# Patient Record
Sex: FEMALE | Race: WHITE | NOT HISPANIC OR LATINO | Employment: FULL TIME | URBAN - NONMETROPOLITAN AREA
[De-identification: names, ages, dates, MRNs, and addresses within clinical notes are randomized per-mention and may not be internally consistent; named-entity substitution may affect disease eponyms.]

---

## 2021-02-05 ENCOUNTER — LAB (OUTPATIENT)
Dept: LAB | Facility: HOSPITAL | Age: 56
End: 2021-02-05

## 2021-02-05 ENCOUNTER — OFFICE VISIT (OUTPATIENT)
Dept: SURGERY | Facility: CLINIC | Age: 56
End: 2021-02-05

## 2021-02-05 VITALS — HEIGHT: 61 IN | BODY MASS INDEX: 29.07 KG/M2 | WEIGHT: 154 LBS

## 2021-02-05 DIAGNOSIS — K85.10 GALLSTONE PANCREATITIS: ICD-10-CM

## 2021-02-05 DIAGNOSIS — K85.10 GALLSTONE PANCREATITIS: Primary | ICD-10-CM

## 2021-02-05 LAB
ALBUMIN SERPL-MCNC: 2.8 G/DL (ref 3.5–5.2)
ALBUMIN/GLOB SERPL: 0.8 G/DL
ALP SERPL-CCNC: 84 U/L (ref 39–117)
ALT SERPL W P-5'-P-CCNC: <5 U/L (ref 1–33)
AMYLASE SERPL-CCNC: 50 U/L (ref 28–100)
ANION GAP SERPL CALCULATED.3IONS-SCNC: 8 MMOL/L (ref 5–15)
AST SERPL-CCNC: 16 U/L (ref 1–32)
BASOPHILS # BLD AUTO: 0.08 10*3/MM3 (ref 0–0.2)
BASOPHILS NFR BLD AUTO: 0.6 % (ref 0–1.5)
BILIRUB SERPL-MCNC: <0.2 MG/DL (ref 0–1.2)
BUN SERPL-MCNC: 5 MG/DL (ref 6–20)
BUN/CREAT SERPL: 6.3 (ref 7–25)
CALCIUM SPEC-SCNC: 8.5 MG/DL (ref 8.6–10.5)
CHLORIDE SERPL-SCNC: 104 MMOL/L (ref 98–107)
CO2 SERPL-SCNC: 28 MMOL/L (ref 22–29)
CREAT SERPL-MCNC: 0.8 MG/DL (ref 0.57–1)
DEPRECATED RDW RBC AUTO: 47.6 FL (ref 37–54)
EOSINOPHIL # BLD AUTO: 0.19 10*3/MM3 (ref 0–0.4)
EOSINOPHIL NFR BLD AUTO: 1.5 % (ref 0.3–6.2)
ERYTHROCYTE [DISTWIDTH] IN BLOOD BY AUTOMATED COUNT: 14.1 % (ref 12.3–15.4)
GFR SERPL CREATININE-BSD FRML MDRD: 74 ML/MIN/1.73
GLOBULIN UR ELPH-MCNC: 3.4 GM/DL
GLUCOSE SERPL-MCNC: 107 MG/DL (ref 65–99)
HCT VFR BLD AUTO: 32 % (ref 34–46.6)
HGB BLD-MCNC: 10.9 G/DL (ref 12–15.9)
IMM GRANULOCYTES # BLD AUTO: 0.12 10*3/MM3 (ref 0–0.05)
IMM GRANULOCYTES NFR BLD AUTO: 0.9 % (ref 0–0.5)
LIPASE SERPL-CCNC: 158 U/L (ref 13–60)
LYMPHOCYTES # BLD AUTO: 1.71 10*3/MM3 (ref 0.7–3.1)
LYMPHOCYTES NFR BLD AUTO: 13.1 % (ref 19.6–45.3)
MCH RBC QN AUTO: 31.8 PG (ref 26.6–33)
MCHC RBC AUTO-ENTMCNC: 34.1 G/DL (ref 31.5–35.7)
MCV RBC AUTO: 93.3 FL (ref 79–97)
MONOCYTES # BLD AUTO: 0.74 10*3/MM3 (ref 0.1–0.9)
MONOCYTES NFR BLD AUTO: 5.7 % (ref 5–12)
NEUTROPHILS NFR BLD AUTO: 10.21 10*3/MM3 (ref 1.7–7)
NEUTROPHILS NFR BLD AUTO: 78.2 % (ref 42.7–76)
NRBC BLD AUTO-RTO: 0 /100 WBC (ref 0–0.2)
PLATELET # BLD AUTO: 268 10*3/MM3 (ref 140–450)
PMV BLD AUTO: 11.5 FL (ref 6–12)
POTASSIUM SERPL-SCNC: 3.7 MMOL/L (ref 3.5–5.2)
PROT SERPL-MCNC: 6.2 G/DL (ref 6–8.5)
RBC # BLD AUTO: 3.43 10*6/MM3 (ref 3.77–5.28)
SODIUM SERPL-SCNC: 140 MMOL/L (ref 136–145)
WBC # BLD AUTO: 13.05 10*3/MM3 (ref 3.4–10.8)

## 2021-02-05 PROCEDURE — 36415 COLL VENOUS BLD VENIPUNCTURE: CPT

## 2021-02-05 PROCEDURE — 99204 OFFICE O/P NEW MOD 45 MIN: CPT | Performed by: SURGERY

## 2021-02-05 PROCEDURE — 80053 COMPREHEN METABOLIC PANEL: CPT

## 2021-02-05 PROCEDURE — 85025 COMPLETE CBC W/AUTO DIFF WBC: CPT

## 2021-02-05 PROCEDURE — 83690 ASSAY OF LIPASE: CPT

## 2021-02-05 PROCEDURE — 82150 ASSAY OF AMYLASE: CPT

## 2021-02-05 RX ORDER — SIMVASTATIN 20 MG
20 TABLET ORAL NIGHTLY
COMMUNITY
End: 2021-05-24

## 2021-02-05 RX ORDER — SODIUM CHLORIDE 0.9 % (FLUSH) 0.9 %
3 SYRINGE (ML) INJECTION EVERY 12 HOURS SCHEDULED
Status: CANCELLED | OUTPATIENT
Start: 2021-02-09

## 2021-02-05 RX ORDER — BUPIVACAINE HCL/0.9 % NACL/PF 0.1 %
2 PLASTIC BAG, INJECTION (ML) EPIDURAL ONCE
Status: CANCELLED | OUTPATIENT
Start: 2021-02-09 | End: 2021-02-05

## 2021-02-05 RX ORDER — PANTOPRAZOLE SODIUM 40 MG/1
40 TABLET, DELAYED RELEASE ORAL DAILY
COMMUNITY
End: 2021-05-05 | Stop reason: SDUPTHER

## 2021-02-05 RX ORDER — CARISOPRODOL 350 MG/1
350 TABLET ORAL 4 TIMES DAILY PRN
COMMUNITY

## 2021-02-05 RX ORDER — ESTRADIOL 2 MG/1
2 TABLET ORAL DAILY
COMMUNITY

## 2021-02-05 RX ORDER — SODIUM CHLORIDE 0.9 % (FLUSH) 0.9 %
10 SYRINGE (ML) INJECTION AS NEEDED
Status: CANCELLED | OUTPATIENT
Start: 2021-02-09

## 2021-02-05 RX ORDER — ONDANSETRON 4 MG/1
4 TABLET, FILM COATED ORAL EVERY 8 HOURS PRN
COMMUNITY
End: 2021-06-10

## 2021-02-05 RX ORDER — ALPRAZOLAM 1 MG/1
1 TABLET ORAL 3 TIMES DAILY PRN
COMMUNITY

## 2021-02-05 RX ORDER — LORATADINE 10 MG/1
10 TABLET ORAL NIGHTLY
COMMUNITY

## 2021-02-05 RX ORDER — HYDROCODONE BITARTRATE AND ACETAMINOPHEN 10; 325 MG/1; MG/1
1 TABLET ORAL EVERY 6 HOURS PRN
COMMUNITY
End: 2021-02-08

## 2021-02-05 NOTE — PROGRESS NOTES
Subjective   Emelyn Cook is a 55 y.o. female     Chief Complaint: Gallstone pancreatitis    History of Present Illness 55-year-old female presented and was admitted to the hospital in [place] on January 25, 2021 with a diagnosis of acute pancreatitis.  Was basically admitted and treated for the pancreatitis.  Was found to have gallstones by ultrasound.  She underwent a total of 3 CT scans while she was in the hospital.  On Monday, February 1 she was taken to the operating room by one of the surgeons and he basically did a diagnostic laparoscopy and decided not to proceed with her cholecystectomy.  Her amylase and lipase were normal at the time LFTs were normal.  He felt he was going to have to open her and instead of open or he stopped the procedure as the patient tells me he did not want to have to open her to remove her gallbladder.  Patient drinks alcohol only occasionally.  Last alcohol she had was a small amount of radha about 3 weeks ago but she goes weeks without drinking any type of alcohol.  She is now been home for 3 days.  She is getting her strength back.  She is able to get up and move around better now.  Pain was described as being in the epigastrium and radiating through to her back.  She denies having any this epigastric pain in the past.  No history of jaundice.  She does continue to feel somewhat bloated but she is tolerating regular diet and having normal bowel function now.  She does not smoke.  She is fairly healthy she played softball up until recently.  Only prior abdominal surgery was a total abdominal hysterectomy and bilateral salpingo-oophorectomy.  Only medication really was hormones and cholesterol medication.    Review of Systems   Constitutional: Negative.    HENT: Negative.    Eyes: Negative.    Respiratory: Negative.    Cardiovascular: Negative.    Gastrointestinal: Positive for abdominal pain.        Heartburn   Endocrine: Negative.    Genitourinary: Negative.     Musculoskeletal: Negative.    Skin: Negative.    Allergic/Immunologic: Negative.    Neurological: Negative.    Hematological: Negative.    Psychiatric/Behavioral: Negative.      Past Medical History:   Diagnosis Date   • Anxiety    • Depression    • Pancreatitis due to common bile duct stone      Past Surgical History:   Procedure Laterality Date   •  SECTION     • DIAGNOSTIC LAPAROSCOPY     • DILATATION AND CURETTAGE     • HYSTERECTOMY       Family History   Problem Relation Age of Onset   • Diabetes Sister    • Diabetes Maternal Aunt      Social History     Socioeconomic History   • Marital status:      Spouse name: Not on file   • Number of children: Not on file   • Years of education: Not on file   • Highest education level: Not on file     Allergies   Allergen Reactions   • Levaquin [Levofloxacin] Mental Status Change     There were no vitals filed for this visit.    Home Medications:  Prior to Admission medications    Medication Sig Start Date End Date Taking? Authorizing Provider   ALPRAZolam (XANAX) 1 MG tablet Take 1 mg by mouth 2 (Two) Times a Day As Needed for Anxiety.   Yes Mehul Fair MD   carisoprodol (SOMA) 350 MG tablet Take 350 mg by mouth 4 (Four) Times a Day As Needed for Muscle Spasms.   Yes Mehul Fair MD   estradiol (ESTRACE) 2 MG tablet Take 2 mg by mouth Daily.   Yes Mehul Fair MD   HYDROcodone-acetaminophen (NORCO)  MG per tablet Take 1 tablet by mouth Every 6 (Six) Hours As Needed for Moderate Pain .   Yes Mehul Fair MD   loratadine (CLARITIN) 10 MG tablet Take 10 mg by mouth Daily.   Yes Mehul Fair MD   ondansetron (ZOFRAN) 4 MG tablet Take 4 mg by mouth Every 8 (Eight) Hours As Needed for Nausea or Vomiting.   Yes Mehul Fair MD   pantoprazole (PROTONIX) 40 MG EC tablet Take 40 mg by mouth Daily.   Yes Mehul Fair MD   simvastatin (ZOCOR) 20 MG tablet Take 20 mg by mouth Every Night.   Yes  Provider, MD Mehul   Zolpidem Tartrate 10 MG sublingual tablet Place  under the tongue.   Yes Provider, MD Mehul       Objective   Physical Exam  Constitutional:       General: She is not in acute distress.     Appearance: She is well-developed.   HENT:      Head: Normocephalic and atraumatic.   Eyes:      General: No scleral icterus.     Conjunctiva/sclera: Conjunctivae normal.   Neck:      Musculoskeletal: Normal range of motion and neck supple.      Thyroid: No thyromegaly.      Trachea: No tracheal deviation.   Cardiovascular:      Rate and Rhythm: Normal rate and regular rhythm.      Heart sounds: Normal heart sounds. No murmur. No friction rub. No gallop.    Pulmonary:      Effort: Pulmonary effort is normal. No respiratory distress.      Breath sounds: Normal breath sounds. No stridor. No wheezing or rales.   Chest:      Chest wall: No tenderness.   Abdominal:      General: Bowel sounds are normal. There is distension.      Palpations: Abdomen is soft. There is no mass.      Tenderness: There is no abdominal tenderness. There is no guarding or rebound.      Hernia: No hernia is present.   Musculoskeletal: Normal range of motion.         General: No deformity.   Lymphadenopathy:      Cervical: No cervical adenopathy.   Skin:     General: Skin is warm and dry.      Coloration: Skin is not pale.      Findings: No erythema or rash.      Nails: There is no clubbing.     Neurological:      Mental Status: She is alert and oriented to person, place, and time.   Psychiatric:         Behavior: Behavior normal.         Thought Content: Thought content normal.     Abdomen is mildly distended but soft.  2 incisions are clean.  Clearly no peritoneal signs no hernias.    Assessment/Plan Gallstone pancreatitis.  Discussed the overall situation with both the patient and her mother-in-law who accompanied her.  Would recommend laparoscopic cholecystectomy and interoperative cholangiogram.  She is at increased risk  of an open procedure due to her history of pancreatitis.  She is also some risk for recurrent pancreatitis she still has gallstones she understands that as well.  Clinically it sounds like she is actually gotten better over the last few days.  We will check an amylase and lipase as well as LFTs and a CBC on her today.  Tentatively we will plan on laparoscopic ostectomy interoperative cholangiogram next Tuesday.  Went over the procedure alternatives risk benefits with the patient she clearly understands and wishes to proceed.  She understands she will need to get the Covid test and what that entails.      The encounter diagnosis was Gallstone pancreatitis.                     This document has been electronically signed by Salvatore Hernandez MD on February 5, 2021 12:44 CST

## 2021-02-05 NOTE — H&P (VIEW-ONLY)
Subjective   Emelyn Cook is a 55 y.o. female     Chief Complaint: Gallstone pancreatitis    History of Present Illness 55-year-old female presented and was admitted to the hospital in [place] on January 25, 2021 with a diagnosis of acute pancreatitis.  Was basically admitted and treated for the pancreatitis.  Was found to have gallstones by ultrasound.  She underwent a total of 3 CT scans while she was in the hospital.  On Monday, February 1 she was taken to the operating room by one of the surgeons and he basically did a diagnostic laparoscopy and decided not to proceed with her cholecystectomy.  Her amylase and lipase were normal at the time LFTs were normal.  He felt he was going to have to open her and instead of open or he stopped the procedure as the patient tells me he did not want to have to open her to remove her gallbladder.  Patient drinks alcohol only occasionally.  Last alcohol she had was a small amount of radha about 3 weeks ago but she goes weeks without drinking any type of alcohol.  She is now been home for 3 days.  She is getting her strength back.  She is able to get up and move around better now.  Pain was described as being in the epigastrium and radiating through to her back.  She denies having any this epigastric pain in the past.  No history of jaundice.  She does continue to feel somewhat bloated but she is tolerating regular diet and having normal bowel function now.  She does not smoke.  She is fairly healthy she played softball up until recently.  Only prior abdominal surgery was a total abdominal hysterectomy and bilateral salpingo-oophorectomy.  Only medication really was hormones and cholesterol medication.    Review of Systems   Constitutional: Negative.    HENT: Negative.    Eyes: Negative.    Respiratory: Negative.    Cardiovascular: Negative.    Gastrointestinal: Positive for abdominal pain.        Heartburn   Endocrine: Negative.    Genitourinary: Negative.       Musculoskeletal: Negative.    Skin: Negative.    Allergic/Immunologic: Negative.    Neurological: Negative.    Hematological: Negative.    Psychiatric/Behavioral: Negative.      Past Medical History:   Diagnosis Date   • Anxiety    • Depression    • Pancreatitis due to common bile duct stone      Past Surgical History:   Procedure Laterality Date   •  SECTION     • DIAGNOSTIC LAPAROSCOPY     • DILATATION AND CURETTAGE     • HYSTERECTOMY       Family History   Problem Relation Age of Onset   • Diabetes Sister    • Diabetes Maternal Aunt      Social History     Socioeconomic History   • Marital status:      Spouse name: Not on file   • Number of children: Not on file   • Years of education: Not on file   • Highest education level: Not on file     Allergies   Allergen Reactions   • Levaquin [Levofloxacin] Mental Status Change     There were no vitals filed for this visit.    Home Medications:  Prior to Admission medications    Medication Sig Start Date End Date Taking? Authorizing Provider   ALPRAZolam (XANAX) 1 MG tablet Take 1 mg by mouth 2 (Two) Times a Day As Needed for Anxiety.   Yes Mehul Fair MD   carisoprodol (SOMA) 350 MG tablet Take 350 mg by mouth 4 (Four) Times a Day As Needed for Muscle Spasms.   Yes Mehul Fair MD   estradiol (ESTRACE) 2 MG tablet Take 2 mg by mouth Daily.   Yes Mehul Fair MD   HYDROcodone-acetaminophen (NORCO)  MG per tablet Take 1 tablet by mouth Every 6 (Six) Hours As Needed for Moderate Pain .   Yes Mehul Fair MD   loratadine (CLARITIN) 10 MG tablet Take 10 mg by mouth Daily.   Yes Mehul Fair MD   ondansetron (ZOFRAN) 4 MG tablet Take 4 mg by mouth Every 8 (Eight) Hours As Needed for Nausea or Vomiting.   Yes Mehul Fair MD   pantoprazole (PROTONIX) 40 MG EC tablet Take 40 mg by mouth Daily.   Yes Mehul Fair MD   simvastatin (ZOCOR) 20 MG tablet Take 20 mg by mouth Every Night.   Yes  Provider, MD Mehul   Zolpidem Tartrate 10 MG sublingual tablet Place  under the tongue.   Yes Provider, MD Mehul       Objective   Physical Exam  Constitutional:       General: She is not in acute distress.     Appearance: She is well-developed.   HENT:      Head: Normocephalic and atraumatic.   Eyes:      General: No scleral icterus.     Conjunctiva/sclera: Conjunctivae normal.   Neck:      Musculoskeletal: Normal range of motion and neck supple.      Thyroid: No thyromegaly.      Trachea: No tracheal deviation.   Cardiovascular:      Rate and Rhythm: Normal rate and regular rhythm.      Heart sounds: Normal heart sounds. No murmur. No friction rub. No gallop.    Pulmonary:      Effort: Pulmonary effort is normal. No respiratory distress.      Breath sounds: Normal breath sounds. No stridor. No wheezing or rales.   Chest:      Chest wall: No tenderness.   Abdominal:      General: Bowel sounds are normal. There is distension.      Palpations: Abdomen is soft. There is no mass.      Tenderness: There is no abdominal tenderness. There is no guarding or rebound.      Hernia: No hernia is present.   Musculoskeletal: Normal range of motion.         General: No deformity.   Lymphadenopathy:      Cervical: No cervical adenopathy.   Skin:     General: Skin is warm and dry.      Coloration: Skin is not pale.      Findings: No erythema or rash.      Nails: There is no clubbing.     Neurological:      Mental Status: She is alert and oriented to person, place, and time.   Psychiatric:         Behavior: Behavior normal.         Thought Content: Thought content normal.     Abdomen is mildly distended but soft.  2 incisions are clean.  Clearly no peritoneal signs no hernias.    Assessment/Plan Gallstone pancreatitis.  Discussed the overall situation with both the patient and her mother-in-law who accompanied her.  Would recommend laparoscopic cholecystectomy and interoperative cholangiogram.  She is at increased risk  of an open procedure due to her history of pancreatitis.  She is also some risk for recurrent pancreatitis she still has gallstones she understands that as well.  Clinically it sounds like she is actually gotten better over the last few days.  We will check an amylase and lipase as well as LFTs and a CBC on her today.  Tentatively we will plan on laparoscopic ostectomy interoperative cholangiogram next Tuesday.  Went over the procedure alternatives risk benefits with the patient she clearly understands and wishes to proceed.  She understands she will need to get the Covid test and what that entails.      The encounter diagnosis was Gallstone pancreatitis.                     This document has been electronically signed by Salvatore Hernandez MD on February 5, 2021 12:44 CST

## 2021-02-05 NOTE — PATIENT INSTRUCTIONS
"BMI for Adults  What is BMI?  Body mass index (BMI) is a number that is calculated from a person's weight and height. BMI can help estimate how much of a person's weight is composed of fat. BMI does not measure body fat directly. Rather, it is an alternative to procedures that directly measure body fat, which can be difficult and expensive.  BMI can help identify people who may be at higher risk for certain medical problems.  What are BMI measurements used for?  BMI is used as a screening tool to identify possible weight problems. It helps determine whether a person is obese, overweight, a healthy weight, or underweight.  BMI is useful for:  · Identifying a weight problem that may be related to a medical condition or may increase the risk for medical problems.  · Promoting changes, such as changes in diet and exercise, to help reach a healthy weight. BMI screening can be repeated to see if these changes are working.  How is BMI calculated?  BMI involves measuring your weight in relation to your height. Both height and weight are measured, and the BMI is calculated from those numbers. This can be done either in English ([place] or metric measurements. Note that charts and online BMI calculators are available to help you find your BMI quickly and easily without having to do these calculations yourself.  To calculate your BMI in English ([place] measurements:    1. Measure your weight in pounds (lb).  2. Multiply the number of pounds by 703.  ? For example, for a person who weighs 180 lb, multiply that number by 703, which equals 126,540.  3. Measure your height in inches. Then multiply that number by itself to get a measurement called \"inches squared.\"  ? For example, for a person who is 70 inches tall, the \"inches squared\" measurement is 70 inches x 70 inches, which equals 4,900 inches squared.  4. Divide the total from step 2 (number of lb x 703) by the total from step 3 (inches squared): 126,540 ÷ 4,900 = 25.8. This is " "your BMI.  To calculate your BMI in metric measurements:  1. Measure your weight in kilograms (kg).  2. Measure your height in meters (m). Then multiply that number by itself to get a measurement called \"meters squared.\"  ? For example, for a person who is 1.75 m tall, the \"meters squared\" measurement is 1.75 m x 1.75 m, which is equal to 3.1 meters squared.  3. Divide the number of kilograms (your weight) by the meters squared number. In this example: 70 ÷ 3.1 = 22.6. This is your BMI.  What do the results mean?  BMI charts are used to identify whether you are underweight, normal weight, overweight, or obese. The following guidelines will be used:  · Underweight: BMI less than 18.5.  · Normal weight: BMI between 18.5 and 24.9.  · Overweight: BMI between 25 and 29.9.  · Obese: BMI of 30 or above.  Keep these notes in mind:  · Weight includes both fat and muscle, so someone with a muscular build, such as an athlete, may have a BMI that is higher than 24.9. In cases like these, BMI is not an accurate measure of body fat.  · To determine if excess body fat is the cause of a BMI of 25 or higher, further assessments may need to be done by a health care provider.  · BMI is usually interpreted in the same way for men and women.  Where to find more information  For more information about BMI, including tools to quickly calculate your BMI, go to these websites:  · Centers for Disease Control and Prevention: www.cdc.gov  · American Heart Association: www.heart.org  · National Heart, Lung, and Blood [place]: www.nhlbi.nih.gov  Summary  · Body mass index (BMI) is a number that is calculated from a person's weight and height.  · BMI may help estimate how much of a person's weight is composed of fat. BMI can help identify those who may be at higher risk for certain medical problems.  · BMI can be measured using English measurements or metric measurements.  · BMI charts are used to identify whether you are underweight, normal " weight, overweight, or obese.  This information is not intended to replace advice given to you by your health care provider. Make sure you discuss any questions you have with your health care provider.  Document Revised: 09/09/2020 Document Reviewed: 07/17/2020  Elsevier Patient Education © 2020 [employer]

## 2021-02-06 ENCOUNTER — LAB (OUTPATIENT)
Dept: LAB | Facility: HOSPITAL | Age: 56
End: 2021-02-06

## 2021-02-06 DIAGNOSIS — Z01.818 PREOP TESTING: Primary | ICD-10-CM

## 2021-02-06 PROCEDURE — C9803 HOPD COVID-19 SPEC COLLECT: HCPCS

## 2021-02-06 PROCEDURE — U0004 COV-19 TEST NON-CDC HGH THRU: HCPCS

## 2021-02-07 LAB — SARS-COV-2 ORF1AB RESP QL NAA+PROBE: NOT DETECTED

## 2021-02-08 ENCOUNTER — ANESTHESIA EVENT (OUTPATIENT)
Dept: PERIOP | Facility: HOSPITAL | Age: 56
End: 2021-02-08

## 2021-02-09 ENCOUNTER — HOSPITAL ENCOUNTER (INPATIENT)
Facility: HOSPITAL | Age: 56
LOS: 1 days | Discharge: HOME | End: 2021-02-10
Attending: SURGERY | Admitting: SURGERY

## 2021-02-09 ENCOUNTER — ANESTHESIA (OUTPATIENT)
Dept: PERIOP | Facility: HOSPITAL | Age: 56
End: 2021-02-09

## 2021-02-09 ENCOUNTER — APPOINTMENT (OUTPATIENT)
Dept: GENERAL RADIOLOGY | Facility: HOSPITAL | Age: 56
End: 2021-02-09

## 2021-02-09 DIAGNOSIS — K85.10 GALLSTONE PANCREATITIS: ICD-10-CM

## 2021-02-09 LAB
ALBUMIN SERPL-MCNC: 3.2 G/DL (ref 3.5–5.2)
ALBUMIN/GLOB SERPL: 0.8 G/DL
ALP SERPL-CCNC: 110 U/L (ref 39–117)
ALT SERPL W P-5'-P-CCNC: 8 U/L (ref 1–33)
ANION GAP SERPL CALCULATED.3IONS-SCNC: 12 MMOL/L (ref 5–15)
AST SERPL-CCNC: 36 U/L (ref 1–32)
BILIRUB SERPL-MCNC: <0.2 MG/DL (ref 0–1.2)
BUN SERPL-MCNC: 5 MG/DL (ref 6–20)
BUN/CREAT SERPL: 7.1 (ref 7–25)
CALCIUM SPEC-SCNC: 8.7 MG/DL (ref 8.6–10.5)
CHLORIDE SERPL-SCNC: 103 MMOL/L (ref 98–107)
CO2 SERPL-SCNC: 23 MMOL/L (ref 22–29)
CREAT SERPL-MCNC: 0.7 MG/DL (ref 0.57–1)
DEPRECATED RDW RBC AUTO: 48.9 FL (ref 37–54)
ERYTHROCYTE [DISTWIDTH] IN BLOOD BY AUTOMATED COUNT: 13.6 % (ref 12.3–15.4)
GFR SERPL CREATININE-BSD FRML MDRD: 86 ML/MIN/1.73
GLOBULIN UR ELPH-MCNC: 3.8 GM/DL
GLUCOSE SERPL-MCNC: 121 MG/DL (ref 65–99)
HCT VFR BLD AUTO: 35.3 % (ref 34–46.6)
HGB BLD-MCNC: 11.7 G/DL (ref 12–15.9)
LYMPHOCYTES # BLD MANUAL: 1.14 10*3/MM3 (ref 0.7–3.1)
LYMPHOCYTES NFR BLD MANUAL: 1 % (ref 5–12)
LYMPHOCYTES NFR BLD MANUAL: 8 % (ref 19.6–45.3)
MCH RBC QN AUTO: 32.1 PG (ref 26.6–33)
MCHC RBC AUTO-ENTMCNC: 33.1 G/DL (ref 31.5–35.7)
MCV RBC AUTO: 96.7 FL (ref 79–97)
MONOCYTES # BLD AUTO: 0.14 10*3/MM3 (ref 0.1–0.9)
NEUTROPHILS # BLD AUTO: 12.98 10*3/MM3 (ref 1.7–7)
NEUTROPHILS NFR BLD MANUAL: 91 % (ref 42.7–76)
PLATELET # BLD AUTO: 632 10*3/MM3 (ref 140–450)
PMV BLD AUTO: 9.1 FL (ref 6–12)
POTASSIUM SERPL-SCNC: 4 MMOL/L (ref 3.5–5.2)
PROT SERPL-MCNC: 7 G/DL (ref 6–8.5)
RBC # BLD AUTO: 3.65 10*6/MM3 (ref 3.77–5.28)
RBC MORPH BLD: NORMAL
SMALL PLATELETS BLD QL SMEAR: ADEQUATE
SODIUM SERPL-SCNC: 138 MMOL/L (ref 136–145)
WBC # BLD AUTO: 14.26 10*3/MM3 (ref 3.4–10.8)
WBC MORPH BLD: NORMAL

## 2021-02-09 PROCEDURE — 25010000002 CEFAZOLIN PER 500 MG: Performed by: SURGERY

## 2021-02-09 PROCEDURE — 85025 COMPLETE CBC W/AUTO DIFF WBC: CPT | Performed by: SURGERY

## 2021-02-09 PROCEDURE — 85007 BL SMEAR W/DIFF WBC COUNT: CPT | Performed by: SURGERY

## 2021-02-09 PROCEDURE — 25010000002 HYDROMORPHONE PER 4 MG: Performed by: NURSE ANESTHETIST, CERTIFIED REGISTERED

## 2021-02-09 PROCEDURE — 25010000002 ONDANSETRON PER 1 MG: Performed by: NURSE ANESTHETIST, CERTIFIED REGISTERED

## 2021-02-09 PROCEDURE — 0FB40ZZ EXCISION OF GALLBLADDER, OPEN APPROACH: ICD-10-PCS | Performed by: SURGERY

## 2021-02-09 PROCEDURE — 25010000002 SUCCINYLCHOLINE PER 20 MG: Performed by: NURSE ANESTHETIST, CERTIFIED REGISTERED

## 2021-02-09 PROCEDURE — 25010000002 FENTANYL CITRATE (PF) 100 MCG/2ML SOLUTION: Performed by: NURSE ANESTHETIST, CERTIFIED REGISTERED

## 2021-02-09 PROCEDURE — 25010000002 NEOSTIGMINE 4 MG/4ML SOLUTION PREFILLED SYRINGE: Performed by: NURSE ANESTHETIST, CERTIFIED REGISTERED

## 2021-02-09 PROCEDURE — 25010000002 PROPOFOL 10 MG/ML EMULSION: Performed by: NURSE ANESTHETIST, CERTIFIED REGISTERED

## 2021-02-09 PROCEDURE — 47600 CHOLECYSTECTOMY: CPT | Performed by: SURGERY

## 2021-02-09 PROCEDURE — 80053 COMPREHEN METABOLIC PANEL: CPT | Performed by: SURGERY

## 2021-02-09 PROCEDURE — 25010000002 HYDROMORPHONE 1 MG/ML SOLUTION: Performed by: NURSE ANESTHETIST, CERTIFIED REGISTERED

## 2021-02-09 PROCEDURE — 25010000002 HYDROMORPHONE 1 MG/ML SOLUTION: Performed by: ANESTHESIOLOGY

## 2021-02-09 PROCEDURE — 25010000002 MIDAZOLAM PER 1 MG: Performed by: NURSE ANESTHETIST, CERTIFIED REGISTERED

## 2021-02-09 PROCEDURE — 25010000002 DEXAMETHASONE PER 1 MG: Performed by: NURSE ANESTHETIST, CERTIFIED REGISTERED

## 2021-02-09 PROCEDURE — 0FJ44ZZ INSPECTION OF GALLBLADDER, PERCUTANEOUS ENDOSCOPIC APPROACH: ICD-10-PCS | Performed by: SURGERY

## 2021-02-09 PROCEDURE — 47600 CHOLECYSTECTOMY: CPT | Performed by: SPECIALIST/TECHNOLOGIST, OTHER

## 2021-02-09 PROCEDURE — 25010000002 MORPHINE PER 10 MG: Performed by: SURGERY

## 2021-02-09 DEVICE — LIGAMAX 5 MM ENDOSCOPIC MULTIPLE CLIP APPLIER
Type: IMPLANTABLE DEVICE | Site: ABDOMEN | Status: FUNCTIONAL
Brand: LIGAMAX

## 2021-02-09 RX ORDER — ESTRADIOL 1 MG/1
2 TABLET ORAL DAILY
Status: DISCONTINUED | OUTPATIENT
Start: 2021-02-09 | End: 2021-02-09

## 2021-02-09 RX ORDER — FENTANYL CITRATE 50 UG/ML
INJECTION, SOLUTION INTRAMUSCULAR; INTRAVENOUS AS NEEDED
Status: DISCONTINUED | OUTPATIENT
Start: 2021-02-09 | End: 2021-02-09 | Stop reason: SURG

## 2021-02-09 RX ORDER — MEPERIDINE HYDROCHLORIDE 25 MG/ML
12.5 INJECTION INTRAMUSCULAR; INTRAVENOUS; SUBCUTANEOUS
Status: DISCONTINUED | OUTPATIENT
Start: 2021-02-09 | End: 2021-02-09 | Stop reason: HOSPADM

## 2021-02-09 RX ORDER — PANTOPRAZOLE SODIUM 40 MG/1
40 TABLET, DELAYED RELEASE ORAL DAILY
Status: DISCONTINUED | OUTPATIENT
Start: 2021-02-10 | End: 2021-02-10 | Stop reason: HOSPADM

## 2021-02-09 RX ORDER — NEOSTIGMINE METHYLSULFATE 4 MG/4 ML
SYRINGE (ML) INTRAVENOUS AS NEEDED
Status: DISCONTINUED | OUTPATIENT
Start: 2021-02-09 | End: 2021-02-09 | Stop reason: SURG

## 2021-02-09 RX ORDER — NALOXONE HCL 0.4 MG/ML
0.4 VIAL (ML) INJECTION
Status: DISCONTINUED | OUTPATIENT
Start: 2021-02-09 | End: 2021-02-10 | Stop reason: HOSPADM

## 2021-02-09 RX ORDER — CARISOPRODOL 350 MG/1
350 TABLET ORAL 4 TIMES DAILY PRN
Status: DISCONTINUED | OUTPATIENT
Start: 2021-02-09 | End: 2021-02-10 | Stop reason: HOSPADM

## 2021-02-09 RX ORDER — LIDOCAINE HYDROCHLORIDE 20 MG/ML
INJECTION, SOLUTION INFILTRATION; PERINEURAL AS NEEDED
Status: DISCONTINUED | OUTPATIENT
Start: 2021-02-09 | End: 2021-02-09 | Stop reason: SURG

## 2021-02-09 RX ORDER — SCOLOPAMINE TRANSDERMAL SYSTEM 1 MG/1
1 PATCH, EXTENDED RELEASE TRANSDERMAL CONTINUOUS
Status: ACTIVE | OUTPATIENT
Start: 2021-02-09 | End: 2021-02-10

## 2021-02-09 RX ORDER — ALPRAZOLAM 1 MG/1
1 TABLET ORAL 2 TIMES DAILY PRN
Status: DISCONTINUED | OUTPATIENT
Start: 2021-02-09 | End: 2021-02-10 | Stop reason: HOSPADM

## 2021-02-09 RX ORDER — DEXAMETHASONE SODIUM PHOSPHATE 4 MG/ML
INJECTION, SOLUTION INTRA-ARTICULAR; INTRALESIONAL; INTRAMUSCULAR; INTRAVENOUS; SOFT TISSUE AS NEEDED
Status: DISCONTINUED | OUTPATIENT
Start: 2021-02-09 | End: 2021-02-09 | Stop reason: SURG

## 2021-02-09 RX ORDER — HEPARIN SODIUM 5000 [USP'U]/ML
5000 INJECTION, SOLUTION INTRAVENOUS; SUBCUTANEOUS EVERY 12 HOURS SCHEDULED
Status: DISCONTINUED | OUTPATIENT
Start: 2021-02-10 | End: 2021-02-10 | Stop reason: HOSPADM

## 2021-02-09 RX ORDER — SODIUM CHLORIDE 0.9 % (FLUSH) 0.9 %
3 SYRINGE (ML) INJECTION EVERY 12 HOURS SCHEDULED
Status: DISCONTINUED | OUTPATIENT
Start: 2021-02-09 | End: 2021-02-09 | Stop reason: HOSPADM

## 2021-02-09 RX ORDER — ALBUTEROL SULFATE 2.5 MG/3ML
2.5 SOLUTION RESPIRATORY (INHALATION) ONCE AS NEEDED
Status: DISCONTINUED | OUTPATIENT
Start: 2021-02-09 | End: 2021-02-09 | Stop reason: HOSPADM

## 2021-02-09 RX ORDER — DEXTROSE, SODIUM CHLORIDE, AND POTASSIUM CHLORIDE 5; .9; .15 G/100ML; G/100ML; G/100ML
50 INJECTION INTRAVENOUS CONTINUOUS
Status: DISCONTINUED | OUTPATIENT
Start: 2021-02-09 | End: 2021-02-10 | Stop reason: HOSPADM

## 2021-02-09 RX ORDER — BUPIVACAINE HCL/0.9 % NACL/PF 0.1 %
2 PLASTIC BAG, INJECTION (ML) EPIDURAL ONCE
Status: COMPLETED | OUTPATIENT
Start: 2021-02-09 | End: 2021-02-09

## 2021-02-09 RX ORDER — SODIUM CHLORIDE 0.9 % (FLUSH) 0.9 %
10 SYRINGE (ML) INJECTION AS NEEDED
Status: DISCONTINUED | OUTPATIENT
Start: 2021-02-09 | End: 2021-02-09 | Stop reason: HOSPADM

## 2021-02-09 RX ORDER — ONDANSETRON 2 MG/ML
4 INJECTION INTRAMUSCULAR; INTRAVENOUS ONCE AS NEEDED
Status: DISCONTINUED | OUTPATIENT
Start: 2021-02-09 | End: 2021-02-09 | Stop reason: HOSPADM

## 2021-02-09 RX ORDER — ONDANSETRON 4 MG/1
4 TABLET, FILM COATED ORAL EVERY 8 HOURS PRN
Status: DISCONTINUED | OUTPATIENT
Start: 2021-02-09 | End: 2021-02-10 | Stop reason: HOSPADM

## 2021-02-09 RX ORDER — ESTRADIOL 1 MG/1
2 TABLET ORAL NIGHTLY
Status: DISCONTINUED | OUTPATIENT
Start: 2021-02-09 | End: 2021-02-10 | Stop reason: HOSPADM

## 2021-02-09 RX ORDER — HYDROMORPHONE HCL 110MG/55ML
PATIENT CONTROLLED ANALGESIA SYRINGE INTRAVENOUS AS NEEDED
Status: DISCONTINUED | OUTPATIENT
Start: 2021-02-09 | End: 2021-02-09 | Stop reason: SURG

## 2021-02-09 RX ORDER — ROCURONIUM BROMIDE 10 MG/ML
INJECTION, SOLUTION INTRAVENOUS AS NEEDED
Status: DISCONTINUED | OUTPATIENT
Start: 2021-02-09 | End: 2021-02-09 | Stop reason: SURG

## 2021-02-09 RX ORDER — MIDAZOLAM HYDROCHLORIDE 1 MG/ML
INJECTION INTRAMUSCULAR; INTRAVENOUS AS NEEDED
Status: DISCONTINUED | OUTPATIENT
Start: 2021-02-09 | End: 2021-02-09 | Stop reason: SURG

## 2021-02-09 RX ORDER — SODIUM CHLORIDE, SODIUM GLUCONATE, SODIUM ACETATE, POTASSIUM CHLORIDE, AND MAGNESIUM CHLORIDE 526; 502; 368; 37; 30 MG/100ML; MG/100ML; MG/100ML; MG/100ML; MG/100ML
1000 INJECTION, SOLUTION INTRAVENOUS CONTINUOUS
Status: DISCONTINUED | OUTPATIENT
Start: 2021-02-09 | End: 2021-02-09

## 2021-02-09 RX ORDER — ONDANSETRON 2 MG/ML
INJECTION INTRAMUSCULAR; INTRAVENOUS AS NEEDED
Status: DISCONTINUED | OUTPATIENT
Start: 2021-02-09 | End: 2021-02-09 | Stop reason: SURG

## 2021-02-09 RX ORDER — BUPIVACAINE HYDROCHLORIDE 5 MG/ML
INJECTION, SOLUTION EPIDURAL; INTRACAUDAL AS NEEDED
Status: DISCONTINUED | OUTPATIENT
Start: 2021-02-09 | End: 2021-02-09 | Stop reason: HOSPADM

## 2021-02-09 RX ORDER — SUCCINYLCHOLINE CHLORIDE 20 MG/ML
INJECTION INTRAMUSCULAR; INTRAVENOUS AS NEEDED
Status: DISCONTINUED | OUTPATIENT
Start: 2021-02-09 | End: 2021-02-09 | Stop reason: SURG

## 2021-02-09 RX ORDER — PROPOFOL 10 MG/ML
VIAL (ML) INTRAVENOUS AS NEEDED
Status: DISCONTINUED | OUTPATIENT
Start: 2021-02-09 | End: 2021-02-09 | Stop reason: SURG

## 2021-02-09 RX ADMIN — SODIUM CHLORIDE, SODIUM GLUCONATE, SODIUM ACETATE, POTASSIUM CHLORIDE, AND MAGNESIUM CHLORIDE: 526; 502; 368; 37; 30 INJECTION, SOLUTION INTRAVENOUS at 08:50

## 2021-02-09 RX ADMIN — PROPOFOL 150 MG: 10 INJECTION, EMULSION INTRAVENOUS at 07:53

## 2021-02-09 RX ADMIN — HYDROMORPHONE HYDROCHLORIDE 0.5 MG: 2 INJECTION, SOLUTION INTRAMUSCULAR; INTRAVENOUS; SUBCUTANEOUS at 08:27

## 2021-02-09 RX ADMIN — HYDROMORPHONE HYDROCHLORIDE 0.5 MG: 2 INJECTION, SOLUTION INTRAMUSCULAR; INTRAVENOUS; SUBCUTANEOUS at 08:15

## 2021-02-09 RX ADMIN — ROCURONIUM BROMIDE 30 MG: 10 INJECTION INTRAVENOUS at 08:07

## 2021-02-09 RX ADMIN — MORPHINE SULFATE 4 MG: 4 INJECTION INTRAVENOUS at 21:16

## 2021-02-09 RX ADMIN — MORPHINE SULFATE 4 MG: 4 INJECTION INTRAVENOUS at 19:05

## 2021-02-09 RX ADMIN — SCOPALAMINE 1 PATCH: 1 PATCH, EXTENDED RELEASE TRANSDERMAL at 07:14

## 2021-02-09 RX ADMIN — HYDROMORPHONE HYDROCHLORIDE 0.5 MG: 1 INJECTION, SOLUTION INTRAMUSCULAR; INTRAVENOUS; SUBCUTANEOUS at 10:10

## 2021-02-09 RX ADMIN — HYDROMORPHONE HYDROCHLORIDE 0.5 MG: 1 INJECTION, SOLUTION INTRAMUSCULAR; INTRAVENOUS; SUBCUTANEOUS at 09:45

## 2021-02-09 RX ADMIN — DEXAMETHASONE SODIUM PHOSPHATE 4 MG: 4 INJECTION, SOLUTION INTRAMUSCULAR; INTRAVENOUS at 07:53

## 2021-02-09 RX ADMIN — MORPHINE SULFATE 4 MG: 4 INJECTION INTRAVENOUS at 23:05

## 2021-02-09 RX ADMIN — SUCCINYLCHOLINE CHLORIDE 120 MG: 20 INJECTION, SOLUTION INTRAMUSCULAR; INTRAVENOUS at 07:53

## 2021-02-09 RX ADMIN — GLYCOPYRROLATE 0.8 MG: 0.2 INJECTION, SOLUTION INTRAMUSCULAR; INTRAVITREAL at 09:28

## 2021-02-09 RX ADMIN — FENTANYL CITRATE 100 MCG: 50 INJECTION, SOLUTION INTRAMUSCULAR; INTRAVENOUS at 07:53

## 2021-02-09 RX ADMIN — ALPRAZOLAM 1 MG: 1 TABLET ORAL at 21:16

## 2021-02-09 RX ADMIN — ROCURONIUM BROMIDE 5 MG: 10 INJECTION INTRAVENOUS at 07:53

## 2021-02-09 RX ADMIN — MORPHINE SULFATE 4 MG: 4 INJECTION INTRAVENOUS at 13:10

## 2021-02-09 RX ADMIN — Medication 5 MG: at 09:28

## 2021-02-09 RX ADMIN — MIDAZOLAM HYDROCHLORIDE 2 MG: 2 INJECTION, SOLUTION INTRAMUSCULAR; INTRAVENOUS at 07:43

## 2021-02-09 RX ADMIN — POTASSIUM CHLORIDE, DEXTROSE MONOHYDRATE AND SODIUM CHLORIDE 100 ML/HR: 150; 5; 900 INJECTION, SOLUTION INTRAVENOUS at 12:19

## 2021-02-09 RX ADMIN — ONDANSETRON 8 MG: 2 INJECTION INTRAMUSCULAR; INTRAVENOUS at 09:08

## 2021-02-09 RX ADMIN — MORPHINE SULFATE 4 MG: 4 INJECTION INTRAVENOUS at 15:29

## 2021-02-09 RX ADMIN — ESTRADIOL 2 MG: 1 TABLET ORAL at 21:16

## 2021-02-09 RX ADMIN — SODIUM CHLORIDE, SODIUM GLUCONATE, SODIUM ACETATE, POTASSIUM CHLORIDE, AND MAGNESIUM CHLORIDE 1000 ML: 526; 502; 368; 37; 30 INJECTION, SOLUTION INTRAVENOUS at 07:14

## 2021-02-09 RX ADMIN — HYDROMORPHONE HYDROCHLORIDE 0.5 MG: 1 INJECTION, SOLUTION INTRAMUSCULAR; INTRAVENOUS; SUBCUTANEOUS at 10:05

## 2021-02-09 RX ADMIN — Medication 2 G: at 07:54

## 2021-02-09 RX ADMIN — ROCURONIUM BROMIDE 15 MG: 10 INJECTION INTRAVENOUS at 08:33

## 2021-02-09 RX ADMIN — LIDOCAINE HYDROCHLORIDE 60 MG: 20 INJECTION, SOLUTION INFILTRATION; PERINEURAL at 07:53

## 2021-02-09 RX ADMIN — POTASSIUM CHLORIDE, DEXTROSE MONOHYDRATE AND SODIUM CHLORIDE 100 ML/HR: 150; 5; 900 INJECTION, SOLUTION INTRAVENOUS at 21:17

## 2021-02-09 RX ADMIN — HYDROMORPHONE HYDROCHLORIDE 0.5 MG: 1 INJECTION, SOLUTION INTRAMUSCULAR; INTRAVENOUS; SUBCUTANEOUS at 10:00

## 2021-02-09 NOTE — INTERVAL H&P NOTE
"H&P reviewed. The patient was examined and there are no changes to the H&P.  Above should state \" tentatively we will plan on laparoscopic cholecystectomy and intraoperative cholangiogram next Tuesday\"         "

## 2021-02-09 NOTE — PAYOR COMM NOTE
"Shanita Aly   [facility]  phone [phone]  fax [phone]    REF# [id number]    Emelyn Cook (55 y.o. Female)     Date of Birth Social Security Number Address Home Phone MRN    1965  [id number] [address] [phone] [id number]    [place] Marital Status          Orthodoxy        Admission Date Admission Type Admitting Provider Attending Provider Department, Room/Bed    2/9/21 Elective Salvatore Hernandez MD Chapman, Darren C, MD [facility] 3 EAST, 377/1    Discharge Date Discharge Disposition Discharge Destination                       Attending Provider: Salvatore Hernandez MD    Allergies: Levaquin [Levofloxacin]    Isolation: None   Infection: None   Code Status: CPR    Ht: 154.9 cm (61\")   Wt: 68.3 kg (150 lb 9.6 oz)    Admission Cmt: None   Principal Problem: Gallstone pancreatitis [K85.10]                 Active Insurance as of 2/9/2021     Primary Coverage     Payor Plan Insurance Group Employer/Plan Group    [id number] PPO [id number]     Payor Plan Address Payor Plan Phone Number Payor Plan Fax Number Effective Dates    [id number] [phone]  10/12/2020 - None Entered    [facility] [id number]       Subscriber Name Subscriber Birth Date Member ID       ROHITH COOK [birth year]/23/1968 [id number]                 Emergency Contacts      (Rel.) Home Phone Work Phone Mobile Phone    LOPEZ LOVELL (Relative) [phone] -- [phone]    ROHITH COOK (Spouse) [phone] -- [phone]               History & Physical      Salvatore Hernandez MD at 02/09/21 0710        H&P reviewed. The patient was examined and there are no changes to the H&P.   Above should state \"tentatively we will plan on laparoscopic cholecystectomy and intraoperative cholangiogram next Tuesday\".  Today patient feels better she is stronger.  She has less swelling and bloating so I suspect the edema and " swelling around her pancreas and retroperitoneum is improved.  I went over all this with the patient.  Patient's LFTs and lipase and amylase are all normal.  Considering everything I think it is reasonable to proceed with surgery at this point.  Long discussion with the patient again this morning.  She understands about the edema and swelling around the pancreas and the reason that the other surgeon did not proceed with her surgery.  I had an opportunity to review all of her CAT scans and can see why that was a reasonable decision.  Patient does understand that she is an increased risk for an open procedure as well and she wishes to proceed with surgery.  We will proceed with laparoscopic cholecystectomy and interoperative cholangiogram.    Temp:  [98 °F (36.7 °C)] 98 °F (36.7 °C)  Heart Rate:  [75] 75  Resp:  [18] 18  BP: (118)/(56) 118/56      Electronically signed by Salvatore Hernandez MD at 02/09/21 0704   Source Note          Subjective   Emelyn Cook is a 55 y.o. female     Chief Complaint: Gallstone pancreatitis    History of Present Illness 55-year-old female presented and was admitted to the hospital in [place] on January 25, 2021 with a diagnosis of acute pancreatitis.  Was basically admitted and treated for the pancreatitis.  Was found to have gallstones by ultrasound.  She underwent a total of 3 CT scans while she was in the hospital.  On Monday, February 1 she was taken to the operating room by one of the surgeons and he basically did a diagnostic laparoscopy and decided not to proceed with her cholecystectomy.  Her amylase and lipase were normal at the time LFTs were normal.  He felt he was going to have to open her and instead of open or he stopped the procedure as the patient tells me he did not want to have to open her to remove her gallbladder.  Patient drinks alcohol only occasionally.  Last alcohol she had was a small amount of radha about 3 weeks ago but she goes weeks without  drinking any type of alcohol.  She is now been home for 3 days.  She is getting her strength back.  She is able to get up and move around better now.  Pain was described as being in the epigastrium and radiating through to her back.  She denies having any this epigastric pain in the past.  No history of jaundice.  She does continue to feel somewhat bloated but she is tolerating regular diet and having normal bowel function now.  She does not smoke.  She is fairly healthy she played softball up until recently.  Only prior abdominal surgery was a total abdominal hysterectomy and bilateral salpingo-oophorectomy.  Only medication really was hormones and cholesterol medication.    Review of Systems   Constitutional: Negative.    HENT: Negative.    Eyes: Negative.    Respiratory: Negative.    Cardiovascular: Negative.    Gastrointestinal: Positive for abdominal pain.        Heartburn   Endocrine: Negative.    Genitourinary: Negative.    Musculoskeletal: Negative.    Skin: Negative.    Allergic/Immunologic: Negative.    Neurological: Negative.    Hematological: Negative.    Psychiatric/Behavioral: Negative.      Past Medical History:   Diagnosis Date   • Anxiety    • Depression    • Pancreatitis due to common bile duct stone      Past Surgical History:   Procedure Laterality Date   •  SECTION     • DIAGNOSTIC LAPAROSCOPY     • DILATATION AND CURETTAGE     • HYSTERECTOMY       Family History   Problem Relation Age of Onset   • Diabetes Sister    • Diabetes Maternal Aunt      Social History     Socioeconomic History   • Marital status:      Spouse name: Not on file   • Number of children: Not on file   • Years of education: Not on file   • Highest education level: Not on file     Allergies   Allergen Reactions   • Levaquin [Levofloxacin] Mental Status Change     There were no vitals filed for this visit.    Home Medications:  Prior to Admission medications    Medication Sig Start Date End Date Taking?  Authorizing Provider   ALPRAZolam (XANAX) 1 MG tablet Take 1 mg by mouth 2 (Two) Times a Day As Needed for Anxiety.   Yes Mehul Fair MD   carisoprodol (SOMA) 350 MG tablet Take 350 mg by mouth 4 (Four) Times a Day As Needed for Muscle Spasms.   Yes Mehul Fair MD   estradiol (ESTRACE) 2 MG tablet Take 2 mg by mouth Daily.   Yes Mehul Fair MD   HYDROcodone-acetaminophen (NORCO)  MG per tablet Take 1 tablet by mouth Every 6 (Six) Hours As Needed for Moderate Pain .   Yes Mehul Fair MD   loratadine (CLARITIN) 10 MG tablet Take 10 mg by mouth Daily.   Yes Mehul Fair MD   ondansetron (ZOFRAN) 4 MG tablet Take 4 mg by mouth Every 8 (Eight) Hours As Needed for Nausea or Vomiting.   Yes Mehul Fair MD   pantoprazole (PROTONIX) 40 MG EC tablet Take 40 mg by mouth Daily.   Yes Mehul Fair MD   simvastatin (ZOCOR) 20 MG tablet Take 20 mg by mouth Every Night.   Yes Mehul Fair MD   Zolpidem Tartrate 10 MG sublingual tablet Place  under the tongue.   Yes Mehul Fair MD       Objective   Physical Exam  Constitutional:       General: She is not in acute distress.     Appearance: She is well-developed.   HENT:      Head: Normocephalic and atraumatic.   Eyes:      General: No scleral icterus.     Conjunctiva/sclera: Conjunctivae normal.   Neck:      Musculoskeletal: Normal range of motion and neck supple.      Thyroid: No thyromegaly.      Trachea: No tracheal deviation.   Cardiovascular:      Rate and Rhythm: Normal rate and regular rhythm.      Heart sounds: Normal heart sounds. No murmur. No friction rub. No gallop.    Pulmonary:      Effort: Pulmonary effort is normal. No respiratory distress.      Breath sounds: Normal breath sounds. No stridor. No wheezing or rales.   Chest:      Chest wall: No tenderness.   Abdominal:      General: Bowel sounds are normal. There is distension.      Palpations: Abdomen is soft. There is no  mass.      Tenderness: There is no abdominal tenderness. There is no guarding or rebound.      Hernia: No hernia is present.   Musculoskeletal: Normal range of motion.         General: No deformity.   Lymphadenopathy:      Cervical: No cervical adenopathy.   Skin:     General: Skin is warm and dry.      Coloration: Skin is not pale.      Findings: No erythema or rash.      Nails: There is no clubbing.     Neurological:      Mental Status: She is alert and oriented to person, place, and time.   Psychiatric:         Behavior: Behavior normal.         Thought Content: Thought content normal.     Abdomen is mildly distended but soft.  2 incisions are clean.  Clearly no peritoneal signs no hernias.    Assessment/Plan Gallstone pancreatitis.  Discussed the overall situation with both the patient and her mother-in-law who accompanied her.  Would recommend laparoscopic cholecystectomy and interoperative cholangiogram.  She is at increased risk of an open procedure due to her history of pancreatitis.  She is also some risk for recurrent pancreatitis she still has gallstones she understands that as well.  Clinically it sounds like she is actually gotten better over the last few days.  We will check an amylase and lipase as well as LFTs and a CBC on her today.  Tentatively we will plan on laparoscopic ostectomy interoperative cholangiogram next Tuesday.  Went over the procedure alternatives risk benefits with the patient she clearly understands and wishes to proceed.  She understands she will need to get the Covid test and what that entails.      The encounter diagnosis was Gallstone pancreatitis.                     This document has been electronically signed by Salvatore Hernandez MD on February 5, 2021 12:44 CST          Electronically signed by Salvatore Hernandez MD at 02/05/21 1244             Salvatore Hernandez MD at 02/09/21 0621        H&P reviewed. The patient was examined and there are no changes to the H&P.  Above  "should state \" tentatively we will plan on laparoscopic cholecystectomy and intraoperative cholangiogram next Tuesday\"           Electronically signed by Salvatore Hernandez MD at 02/09/21 7536   Source Note          Subjective   Emelyn Cook is a 55 y.o. female     Chief Complaint: Gallstone pancreatitis    History of Present Illness 55-year-old female presented and was admitted to the hospital in [place] on January 25, 2021 with a diagnosis of acute pancreatitis.  Was basically admitted and treated for the pancreatitis.  Was found to have gallstones by ultrasound.  She underwent a total of 3 CT scans while she was in the hospital.  On Monday, February 1 she was taken to the operating room by one of the surgeons and he basically did a diagnostic laparoscopy and decided not to proceed with her cholecystectomy.  Her amylase and lipase were normal at the time LFTs were normal.  He felt he was going to have to open her and instead of open or he stopped the procedure as the patient tells me he did not want to have to open her to remove her gallbladder.  Patient drinks alcohol only occasionally.  Last alcohol she had was a small amount of radha about 3 weeks ago but she goes weeks without drinking any type of alcohol.  She is now been home for 3 days.  She is getting her strength back.  She is able to get up and move around better now.  Pain was described as being in the epigastrium and radiating through to her back.  She denies having any this epigastric pain in the past.  No history of jaundice.  She does continue to feel somewhat bloated but she is tolerating regular diet and having normal bowel function now.  She does not smoke.  She is fairly healthy she played softball up until recently.  Only prior abdominal surgery was a total abdominal hysterectomy and bilateral salpingo-oophorectomy.  Only medication really was hormones and cholesterol medication.    Review of Systems   Constitutional: Negative.  "   HENT: Negative.    Eyes: Negative.    Respiratory: Negative.    Cardiovascular: Negative.    Gastrointestinal: Positive for abdominal pain.        Heartburn   Endocrine: Negative.    Genitourinary: Negative.    Musculoskeletal: Negative.    Skin: Negative.    Allergic/Immunologic: Negative.    Neurological: Negative.    Hematological: Negative.    Psychiatric/Behavioral: Negative.      Past Medical History:   Diagnosis Date   • Anxiety    • Depression    • Pancreatitis due to common bile duct stone      Past Surgical History:   Procedure Laterality Date   •  SECTION     • DIAGNOSTIC LAPAROSCOPY     • DILATATION AND CURETTAGE     • HYSTERECTOMY       Family History   Problem Relation Age of Onset   • Diabetes Sister    • Diabetes Maternal Aunt      Social History     Socioeconomic History   • Marital status:      Spouse name: Not on file   • Number of children: Not on file   • Years of education: Not on file   • Highest education level: Not on file     Allergies   Allergen Reactions   • Levaquin [Levofloxacin] Mental Status Change     There were no vitals filed for this visit.    Home Medications:  Prior to Admission medications    Medication Sig Start Date End Date Taking? Authorizing Provider   ALPRAZolam (XANAX) 1 MG tablet Take 1 mg by mouth 2 (Two) Times a Day As Needed for Anxiety.   Yes Mehul Fair MD   carisoprodol (SOMA) 350 MG tablet Take 350 mg by mouth 4 (Four) Times a Day As Needed for Muscle Spasms.   Yes Mehul Fair MD   estradiol (ESTRACE) 2 MG tablet Take 2 mg by mouth Daily.   Yes Mehul Fair MD   HYDROcodone-acetaminophen (NORCO)  MG per tablet Take 1 tablet by mouth Every 6 (Six) Hours As Needed for Moderate Pain .   Yes Mehul Fair MD   loratadine (CLARITIN) 10 MG tablet Take 10 mg by mouth Daily.   Yes Mehul Fair MD   ondansetron (ZOFRAN) 4 MG tablet Take 4 mg by mouth Every 8 (Eight) Hours As Needed for Nausea or  Vomiting.   Yes Provider, MD Mehul   pantoprazole (PROTONIX) 40 MG EC tablet Take 40 mg by mouth Daily.   Yes Provider, MD Mehul   simvastatin (ZOCOR) 20 MG tablet Take 20 mg by mouth Every Night.   Yes ProviderMehul MD   Zolpidem Tartrate 10 MG sublingual tablet Place  under the tongue.   Yes Provider, MD Mehul       Objective   Physical Exam  Constitutional:       General: She is not in acute distress.     Appearance: She is well-developed.   HENT:      Head: Normocephalic and atraumatic.   Eyes:      General: No scleral icterus.     Conjunctiva/sclera: Conjunctivae normal.   Neck:      Musculoskeletal: Normal range of motion and neck supple.      Thyroid: No thyromegaly.      Trachea: No tracheal deviation.   Cardiovascular:      Rate and Rhythm: Normal rate and regular rhythm.      Heart sounds: Normal heart sounds. No murmur. No friction rub. No gallop.    Pulmonary:      Effort: Pulmonary effort is normal. No respiratory distress.      Breath sounds: Normal breath sounds. No stridor. No wheezing or rales.   Chest:      Chest wall: No tenderness.   Abdominal:      General: Bowel sounds are normal. There is distension.      Palpations: Abdomen is soft. There is no mass.      Tenderness: There is no abdominal tenderness. There is no guarding or rebound.      Hernia: No hernia is present.   Musculoskeletal: Normal range of motion.         General: No deformity.   Lymphadenopathy:      Cervical: No cervical adenopathy.   Skin:     General: Skin is warm and dry.      Coloration: Skin is not pale.      Findings: No erythema or rash.      Nails: There is no clubbing.     Neurological:      Mental Status: She is alert and oriented to person, place, and time.   Psychiatric:         Behavior: Behavior normal.         Thought Content: Thought content normal.     Abdomen is mildly distended but soft.  2 incisions are clean.  Clearly no peritoneal signs no hernias.    Assessment/Plan Gallstone  pancreatitis.  Discussed the overall situation with both the patient and her mother-in-law who accompanied her.  Would recommend laparoscopic cholecystectomy and interoperative cholangiogram.  She is at increased risk of an open procedure due to her history of pancreatitis.  She is also some risk for recurrent pancreatitis she still has gallstones she understands that as well.  Clinically it sounds like she is actually gotten better over the last few days.  We will check an amylase and lipase as well as LFTs and a CBC on her today.  Tentatively we will plan on laparoscopic ostectomy interoperative cholangiogram next Tuesday.  Went over the procedure alternatives risk benefits with the patient she clearly understands and wishes to proceed.  She understands she will need to get the Covid test and what that entails.      The encounter diagnosis was Gallstone pancreatitis.                     This document has been electronically signed by Salvatore Hernandez MD on February 5, 2021 12:44 CST          Electronically signed by Salvatore Hernandez MD at 02/05/21 1244             Salvatore Hernandez MD at 02/05/21 1045          Subjective   Emelyn Cook is a 55 y.o. female     Chief Complaint: Gallstone pancreatitis    History of Present Illness 55-year-old female presented and was admitted to the hospital in [place] on January 25, 2021 with a diagnosis of acute pancreatitis.  Was basically admitted and treated for the pancreatitis.  Was found to have gallstones by ultrasound.  She underwent a total of 3 CT scans while she was in the hospital.  On Monday, February 1 she was taken to the operating room by one of the surgeons and he basically did a diagnostic laparoscopy and decided not to proceed with her cholecystectomy.  Her amylase and lipase were normal at the time LFTs were normal.  He felt he was going to have to open her and instead of open or he stopped the procedure as the patient tells me he did not want to have  to open her to remove her gallbladder.  Patient drinks alcohol only occasionally.  Last alcohol she had was a small amount of radha about 3 weeks ago but she goes weeks without drinking any type of alcohol.  She is now been home for 3 days.  She is getting her strength back.  She is able to get up and move around better now.  Pain was described as being in the epigastrium and radiating through to her back.  She denies having any this epigastric pain in the past.  No history of jaundice.  She does continue to feel somewhat bloated but she is tolerating regular diet and having normal bowel function now.  She does not smoke.  She is fairly healthy she played softball up until recently.  Only prior abdominal surgery was a total abdominal hysterectomy and bilateral salpingo-oophorectomy.  Only medication really was hormones and cholesterol medication.    Review of Systems   Constitutional: Negative.    HENT: Negative.    Eyes: Negative.    Respiratory: Negative.    Cardiovascular: Negative.    Gastrointestinal: Positive for abdominal pain.        Heartburn   Endocrine: Negative.    Genitourinary: Negative.    Musculoskeletal: Negative.    Skin: Negative.    Allergic/Immunologic: Negative.    Neurological: Negative.    Hematological: Negative.    Psychiatric/Behavioral: Negative.      Past Medical History:   Diagnosis Date   • Anxiety    • Depression    • Pancreatitis due to common bile duct stone      Past Surgical History:   Procedure Laterality Date   •  SECTION     • DIAGNOSTIC LAPAROSCOPY     • DILATATION AND CURETTAGE     • HYSTERECTOMY       Family History   Problem Relation Age of Onset   • Diabetes Sister    • Diabetes Maternal Aunt      Social History     Socioeconomic History   • Marital status:      Spouse name: Not on file   • Number of children: Not on file   • Years of education: Not on file   • Highest education level: Not on file     Allergies   Allergen Reactions   • Levaquin  [Levofloxacin] Mental Status Change     There were no vitals filed for this visit.    Home Medications:  Prior to Admission medications    Medication Sig Start Date End Date Taking? Authorizing Provider   ALPRAZolam (XANAX) 1 MG tablet Take 1 mg by mouth 2 (Two) Times a Day As Needed for Anxiety.   Yes Mehul Fair MD   carisoprodol (SOMA) 350 MG tablet Take 350 mg by mouth 4 (Four) Times a Day As Needed for Muscle Spasms.   Yes Mehul Fair MD   estradiol (ESTRACE) 2 MG tablet Take 2 mg by mouth Daily.   Yes Mehul Fair MD   HYDROcodone-acetaminophen (NORCO)  MG per tablet Take 1 tablet by mouth Every 6 (Six) Hours As Needed for Moderate Pain .   Yes Mehul Fair MD   loratadine (CLARITIN) 10 MG tablet Take 10 mg by mouth Daily.   Yes Mehul Fair MD   ondansetron (ZOFRAN) 4 MG tablet Take 4 mg by mouth Every 8 (Eight) Hours As Needed for Nausea or Vomiting.   Yes Mehul Fair MD   pantoprazole (PROTONIX) 40 MG EC tablet Take 40 mg by mouth Daily.   Yes Mehul Fair MD   simvastatin (ZOCOR) 20 MG tablet Take 20 mg by mouth Every Night.   Yes Mehul Fair MD   Zolpidem Tartrate 10 MG sublingual tablet Place  under the tongue.   Yes Mehul Fair MD       Objective   Physical Exam  Constitutional:       General: She is not in acute distress.     Appearance: She is well-developed.   HENT:      Head: Normocephalic and atraumatic.   Eyes:      General: No scleral icterus.     Conjunctiva/sclera: Conjunctivae normal.   Neck:      Musculoskeletal: Normal range of motion and neck supple.      Thyroid: No thyromegaly.      Trachea: No tracheal deviation.   Cardiovascular:      Rate and Rhythm: Normal rate and regular rhythm.      Heart sounds: Normal heart sounds. No murmur. No friction rub. No gallop.    Pulmonary:      Effort: Pulmonary effort is normal. No respiratory distress.      Breath sounds: Normal breath sounds. No stridor. No  wheezing or rales.   Chest:      Chest wall: No tenderness.   Abdominal:      General: Bowel sounds are normal. There is distension.      Palpations: Abdomen is soft. There is no mass.      Tenderness: There is no abdominal tenderness. There is no guarding or rebound.      Hernia: No hernia is present.   Musculoskeletal: Normal range of motion.         General: No deformity.   Lymphadenopathy:      Cervical: No cervical adenopathy.   Skin:     General: Skin is warm and dry.      Coloration: Skin is not pale.      Findings: No erythema or rash.      Nails: There is no clubbing.     Neurological:      Mental Status: She is alert and oriented to person, place, and time.   Psychiatric:         Behavior: Behavior normal.         Thought Content: Thought content normal.     Abdomen is mildly distended but soft.  2 incisions are clean.  Clearly no peritoneal signs no hernias.    Assessment/Plan Gallstone pancreatitis.  Discussed the overall situation with both the patient and her mother-in-law who accompanied her.  Would recommend laparoscopic cholecystectomy and interoperative cholangiogram.  She is at increased risk of an open procedure due to her history of pancreatitis.  She is also some risk for recurrent pancreatitis she still has gallstones she understands that as well.  Clinically it sounds like she is actually gotten better over the last few days.  We will check an amylase and lipase as well as LFTs and a CBC on her today.  Tentatively we will plan on laparoscopic ostectomy interoperative cholangiogram next Tuesday.  Went over the procedure alternatives risk benefits with the patient she clearly understands and wishes to proceed.  She understands she will need to get the Covid test and what that entails.      The encounter diagnosis was Gallstone pancreatitis.                     This document has been electronically signed by Salvatore Hernandez MD on February 5, 2021 12:44 CST          Electronically signed by  Salvatore Hernandez MD at 02/05/21 1244       Emergency Department Notes    No notes of this type exist for this encounter.           Facility-Administered Medications as of 2/9/2021   Medication Dose Route Frequency Provider Last Rate Last Admin   • ALPRAZolam (XANAX) tablet 1 mg  1 mg Oral BID PRN Salvatore Hernandez MD       • carisoprodol (SOMA) tablet 350 mg  350 mg Oral 4x Daily PRN Salvatore Hernandez MD       • [COMPLETED] ceFAZolin in 0.9% normal saline (ANCEF) IVPB solution 2 g  2 g Intravenous Once Salvatore Hernandez MD   2 g at 02/09/21 0754   • dextrose 5 % and sodium chloride 0.9 % with KCl 20 mEq/L infusion  100 mL/hr Intravenous Continuous Salvatore Hernandez  mL/hr at 02/09/21 1220 100 mL/hr at 02/09/21 1220   • estradiol (ESTRACE) tablet 2 mg  2 mg Oral Nightly Salvatore Hernandez MD       • [START ON 2/10/2021] heparin (porcine) 5000 UNIT/ML injection 5,000 Units  5,000 Units Subcutaneous Q12H Salvatore Hernandez MD       • morphine injection 4 mg  4 mg Intravenous Q2H PRN Salvatore Hernandez MD   4 mg at 02/09/21 1310    And   • naloxone (NARCAN) injection 0.4 mg  0.4 mg Intravenous Q5 Min PRN Salvatore Hernandez MD       • ondansetron (ZOFRAN) tablet 4 mg  4 mg Oral Q8H PRN Salvatore Hernandez MD       • [START ON 2/10/2021] pantoprazole (PROTONIX) EC tablet 40 mg  40 mg Oral Daily Salvatore Hernandez MD       • Scopolamine (TRANSDERM-SCOP) 1.5 MG/3DAYS patch 1 patch  1 patch Transdermal Continuous Caio Dumont MD   1 patch at 02/09/21 0714       Lab Results (last 24 hours)     Procedure Component Value Units Date/Time    Manual Differential [[id number]]  (Abnormal) Collected: 02/09/21 1204    Specimen: Blood Updated: 02/09/21 1324     Neutrophil % 91.0 %      Lymphocyte % 8.0 %      Monocyte % 1.0 %      Neutrophils Absolute 12.98 10*3/mm3      Lymphocytes Absolute 1.14 10*3/mm3      Monocytes Absolute 0.14 10*3/mm3      RBC Morphology Normal     WBC Morphology Normal     Platelet Estimate  Adequate    Comprehensive Metabolic Panel [[id number]]  (Abnormal) Collected: 02/09/21 1204    Specimen: Blood Updated: 02/09/21 1247     Glucose 121 mg/dL      BUN 5 mg/dL      Creatinine 0.70 mg/dL      Sodium 138 mmol/L      Potassium 4.0 mmol/L      Comment: Slight hemolysis detected by analyzer. Results may be affected.        Chloride 103 mmol/L      CO2 23.0 mmol/L      Calcium 8.7 mg/dL      Total Protein 7.0 g/dL      Albumin 3.20 g/dL      ALT (SGPT) 8 U/L      AST (SGOT) 36 U/L      Alkaline Phosphatase 110 U/L      Total Bilirubin <0.2 mg/dL      eGFR Non African Amer 86 mL/min/1.73      Globulin 3.8 gm/dL      A/G Ratio 0.8 g/dL      BUN/Creatinine Ratio 7.1     Anion Gap 12.0 mmol/L     Narrative:      GFR Normal >60  Chronic Kidney Disease <60  Kidney Failure <15      CBC & Differential [[id number]]  (Abnormal) Collected: 02/09/21 1204    Specimen: Blood Updated: 02/09/21 1231    Narrative:      The following orders were created for panel order CBC & Differential.  Procedure                               Abnormality         Status                     ---------                               -----------         ------                     CBC Auto Differential[[id number]]        Abnormal            Final result                 Please view results for these tests on the individual orders.    CBC Auto Differential [[id number]]  (Abnormal) Collected: 02/09/21 1204    Specimen: Blood Updated: 02/09/21 1231     WBC 14.26 10*3/mm3      RBC 3.65 10*6/mm3      Hemoglobin 11.7 g/dL      Hematocrit 35.3 %      MCV 96.7 fL      MCH 32.1 pg      MCHC 33.1 g/dL      RDW 13.6 %      RDW-SD 48.9 fl      MPV 9.1 fL      Platelets 632 10*3/mm3      Comment: SPECIMEN REANALYZED TO CONFIRM PLATELET COUNT       Tissue Pathology Exam [[id number]] Collected: 02/09/21 0823    Specimen: Tissue from Gallbladder Updated: 02/09/21 1000        Imaging Results (Last 24 Hours)     ** No results found for the last 24 hours. **         ECG/EMG Results (last 24 hours)     ** No results found for the last 24 hours. **           Operative/Procedure Notes (last 24 hours) (Notes from 02/08/21 1355 through 02/09/21 1355)      Salvatore Hernandez MD at 02/09/21 0709          CHOLECYSTECTOMY LAPAROSCOPIC INTRAOPERATIVE CHOLANGIOGRAM  Procedure Note    Emelyn Cook  2/9/2021    Pre-op Diagnosis:   Gallstone pancreatitis [K85.10]    Post-op Diagnosis:     Post-Op Diagnosis Codes:     * Gallstone pancreatitis [K85.10]    Procedure/CPT® Codes:      Procedure(s):  subtotal cholecystectomy    Surgeon(s):  Salvatore Hernandez MD    Anesthesia: General    Staff:   Circulator: Elif Cantrell RN; Maribel Askew RN  Scrub Person: Kalie Urena  Assistant: Jazlyn Horner CSA    Assistant: Jazlyn Horner CSA was responsible for performing the following activities: Retraction, Suction, Irrigation, Suturing, Closing and Placing Dressing and their skilled assistance was necessary for the success of this case.     Estimated Blood Loss: <500ml    Specimens:                ID Type Source Tests Collected by Time   A : gallbladder plus contents  Tissue Gallbladder TISSUE PATHOLOGY EXAM Salvatore Hernandez MD 2/9/2021 0823         Drains:   Y Closed/Suction Drain 1 and 2 (Active)   Dressing Status 1 Clean;Dry;Intact 02/09/21 0940   Drainage Appearance 1 Bloody 02/09/21 0901   Status 1 To bulb suction 02/09/21 0940       Indications: 55-year-old female who was hospitalized now 2 weeks ago at [facility] for gallstone pancreatitis.  Patient was initially treated medically and was actually taken the operating room and had a diagnostic laparoscopy.  They did not proceed with taking the gallbladder out at that time due to concern about edema and swelling around the pancreas and the gallbladder.  Patient saw us as outpatient last week after she been home for a few days.  LFTs amylase lipase are all normal now.  Swelling and edema has improved  patient feels clearly less bloated than she did when she left the hospital and even 1 week saw her a few days ago in the office.  Went over everything with her she is ready to undergo laparoscopic cholecystectomy and interoperative cholangiogram.  She is clearly aware that she is at increased risk of open procedure and she is fine with that.   Dictation on: 02/09/2021  9:55 AM by: SALVATORE HERNANDEZ [[id number]]   Findings: Retroperitoneum swelling and edema consistent with recent history of pancreatitis.  Able to free the gallbladder down to the neck of the gallbladder but the neck the gallbladder was densely adherent to the duodenum and other structures so we elected to do a subtotal cholecystectomy.  1 9 2 to 3 mm stone was found within the gallbladder.  No cholangiogram was performed    Complications: None Dictation on: 02/09/2021  9:55 AM by: SALVATORE HERNANDEZ [[id number]]         Procedure:   Dictation on: 02/09/2021  9:55 AM by: SALVATORE HERNANDEZ [[id number]]     Disposition: Transfer to recovery room in stable condition.  Patient will be admitted to an [facility] bed.        Salvatore Hernandez MD     Date: 2/9/2021  Time: 09:48 CST        Electronically signed by Salvatore Hernandez MD at 02/09/21 0908       Physician Progress Notes (last 24 hours) (Notes from 02/08/21 1355 through 02/09/21 1355)    No notes of this type exist for this encounter.         Consult Notes (last 24 hours) (Notes from 02/08/21 1355 through 02/09/21 1355)    No notes of this type exist for this encounter.

## 2021-02-09 NOTE — ANESTHESIA PROCEDURE NOTES
Airway  Urgency: elective    Date/Time: 2/9/2021 7:53 AM  Airway not difficult    General Information and Staff    Patient location during procedure: OR    Indications and Patient Condition  Indications for airway management: airway protection    Preoxygenated: yes  MILS maintained throughout  Mask difficulty assessment: 0 - not attempted    Final Airway Details  Final airway type: endotracheal airway      Successful airway: ETT  Cuffed: yes   Successful intubation technique: direct laryngoscopy  Facilitating devices/methods: intubating stylet and cricoid pressure  Endotracheal tube insertion site: oral  Blade: Edna  Blade size: 3  ETT size (mm): 7.0  Cormack-Lehane Classification: grade I - full view of glottis  Placement verified by: chest auscultation and capnometry   Cuff volume (mL): 8  Measured from: lips  ETT/EBT  to lips (cm): 19  Number of attempts at approach: 1  Assessment: lips, teeth, and gum same as pre-op and atraumatic intubation

## 2021-02-09 NOTE — OP NOTE
CHOLECYSTECTOMY LAPAROSCOPIC INTRAOPERATIVE CHOLANGIOGRAM  Procedure Note    Emelyn Cook  2/9/2021    Pre-op Diagnosis:   Gallstone pancreatitis [K85.10]    Post-op Diagnosis:     Post-Op Diagnosis Codes:     * Gallstone pancreatitis [K85.10]    Procedure/CPT® Codes:      Procedure(s):  subtotal cholecystectomy    Surgeon(s):  Salvatore Hernandez MD    Anesthesia: General    Staff:   Circulator: Elif Cantrell RN; Maribel Askew RN  Scrub Person: Kalie Urena  Assistant: Jazlyn Horner CSA    Assistant: Jazlyn Horner CSA was responsible for performing the following activities: Retraction, Suction, Irrigation, Suturing, Closing and Placing Dressing and their skilled assistance was necessary for the success of this case.     Estimated Blood Loss: <500ml    Specimens:                ID Type Source Tests Collected by Time   A : gallbladder plus contents  Tissue Gallbladder TISSUE PATHOLOGY EXAM Salvatore Hernandez MD 2/9/2021 0823         Drains:   Y Closed/Suction Drain 1 and 2 (Active)   Dressing Status 1 Clean;Dry;Intact 02/09/21 0940   Drainage Appearance 1 Bloody 02/09/21 0901   Status 1 To bulb suction 02/09/21 0940       Indications: 55-year-old female who was hospitalized now 2 weeks ago at [facility] for gallstone pancreatitis.  Patient was initially treated medically and was actually taken the operating room and had a diagnostic laparoscopy.  They did not proceed with taking the gallbladder out at that time due to concern about edema and swelling around the pancreas and the gallbladder.  Patient saw us as outpatient last week after she been home for a few days.  LFTs amylase lipase are all normal now.  Swelling and edema has improved patient feels clearly less bloated than she did when she left the hospital and even 1 week saw her a few days ago in the office.  Went over everything with her she is ready to undergo laparoscopic cholecystectomy and interoperative  cholangiogram.  She is clearly aware that she is at increased risk of open procedure and she is fine with that.      Findings: Retroperitoneum swelling and edema consistent with recent history of pancreatitis.  Able to free the gallbladder down to the neck of the gallbladder but the neck the gallbladder was densely adherent to the duodenum and other structures so we elected to do a subtotal cholecystectomy.  One 2 to 3 mm stone was found within the gallbladder.  No cholangiogram was performed    Complications: None       Dictation: Patient was brought to the operating room where she was placed under general endotracheal anesthesia without any difficulty. She received Levaquin IV antibiotics perioperatively. She had SCDs in place. Abdomen was prepped and draped in normal sterile fashion. Timeout was done and everyone was in agreement.   Cutdown was performed a couple of centimeters above the umbilicus and a 10/11 Fidelina trocar was placed without any difficulty. After pneumoperitoneum was obtained, a TAP abdominal wall block was performed with a total of 30 mL of 0.5% Marcaine; 20 mL were injected on the right side and 10 mL on the left side using the laparoscope for guidance. Gallbladder was able to be identified.  We put a 5 mm trocar in the epigastrium and two 5 mm trocars along the costal margin laterally all under direct visualization without any difficulty.  We began by taking the gallbladder down in a dome-down type technique. We took it down approximately just over half way in the bed of the liver. Plane posteriorly with liver became difficult to appreciate laparoscopically.   We attempted then to dissect anteriorly, but laparoscopically this was somewhat concerning due to the chronic adhesions of the neck of the gallbladder, to possible duodenum and other structures so we elected to convert to an open procedure.  We took all trocars out.  We closed the fascia at the cutdown site with 2-0 Vicryl  figure-of-eight stitch. We then made a subcostal incision sharply with a scalpel and followed that through the subcutaneous tissue and abdominal wall with electrocautery.  We were able to get into the abdomen without any difficulty. Gallbladder was easily found.  Bookwalter was placed into position giving us good exposure to what was seen laparoscopically.  This was somewhat limited however due to the edema and swelling around the head of the pancreas and the duodenum.  We were able to dissect down in a dome-down type technique from the liver a few more centimeters, but anteriorly attempted to dissect and plane was not easily identified with duodenum and other structures.    We could not dissect down any further we felt safely so we elected to do a subtotal cholecystectomy.  We opened the gallbladder up on the body that was dissected out with the Harmonic and then suctioned out the gallbladder.  One small stone was noted.  There may have been other smaller stones suctioned up as well.  There was dark normal appearing bile present within the gallbladder.  The body of the gallbladder was removed completely and then we carefully inspected.  We were down to the upper part of the neck of the gallbladder.  There were no other stones present.  There was some clear bile coming back.  I elected to close the cystic duct from within the neck of the gallbladder with a 3-0 Vicryl stitch  pursestring.  We did this in 2 layers.  Careful not to dissect down any further near the duodenum, which there was very minimal to actually no dissection done in this area.   A 10 mm Seven-Helms drain was brought out through a separate stab incision using the lateral trocar site .  We copiously irrigated.  Good hemostasis was noted.  The drain appeared to be in good position.    Drain was secured to the skin with a 2-0 silk.  Fascia was then closed with running #1 PDS sutures with 2 layers.  Subcutaneous level was irrigated and the  subcutaneous was closed with interrupted 2-0 Vicryl simple stitches and skin staples were used at both sites.  Dressing was placed around the drain.  Patient was awakened, extubated and transferred to the recovery room in awake, stable condition.          Procedure:   Dictation on: 02/09/2021  9:55 AM by: SALVATORE HERNANDEZ [[id number]]     Disposition: Transfer to recovery room in stable condition.  Patient will be admitted to an [facility] bed.        Salvatore Hernandez MD     Date: 2/9/2021  Time: 09:48 CST

## 2021-02-09 NOTE — PLAN OF CARE
Goal Outcome Evaluation:  Plan of Care Reviewed With: patient  Progress: no change  Outcome Summary: pt admitted and oriened to 3E, vss, fall precautions in place, dressings dry/intact, KRISTEN compressed - bloody output, pain controlled with PRN Morphine, voiding adequately post op

## 2021-02-09 NOTE — INTERVAL H&P NOTE
"H&P reviewed. The patient was examined and there are no changes to the H&P.   Above should state \"tentatively we will plan on laparoscopic cholecystectomy and intraoperative cholangiogram next Tuesday\".  Today patient feels better she is stronger.  She has less swelling and bloating so I suspect the edema and swelling around her pancreas and retroperitoneum is improved.  I went over all this with the patient.  Patient's LFTs and lipase and amylase are all normal.  Considering everything I think it is reasonable to proceed with surgery at this point.  Long discussion with the patient again this morning.  She understands about the edema and swelling around the pancreas and the reason that the other surgeon did not proceed with her surgery.  I had an opportunity to review all of her CAT scans and can see why that was a reasonable decision.  Patient does understand that she is an increased risk for an open procedure as well and she wishes to proceed with surgery.  We will proceed with laparoscopic cholecystectomy and interoperative cholangiogram.    Temp:  [98 °F (36.7 °C)] 98 °F (36.7 °C)  Heart Rate:  [75] 75  Resp:  [18] 18  BP: (118)/(56) 118/56    "

## 2021-02-09 NOTE — ANESTHESIA POSTPROCEDURE EVALUATION
Patient: Emelyn Cook    Procedure Summary     Date: 02/09/21 Room / Location: [facility] OR 09 / [facility] OR    Anesthesia Start: 0744 Anesthesia Stop: 0943    Procedure: subtotal cholecystectomy (N/A Abdomen) Diagnosis:       Gallstone pancreatitis      (Gallstone pancreatitis [K85.10])    Surgeon: Salvatore Hernandez MD Provider: Caio Dumont MD    Anesthesia Type: general ASA Status: 3          Anesthesia Type: general    Vitals  No vitals data found for the desired time range.          Post Anesthesia Care and Evaluation    Patient location during evaluation: bedside  Patient participation: complete - patient cannot participate  Level of consciousness: obtunded/minimal responses  Pain score: 0  Pain management: adequate  Airway patency: patent  Anesthetic complications: No anesthetic complications  PONV Status: none  Cardiovascular status: acceptable  Respiratory status: acceptable  Hydration status: acceptable    Comments: Extubated deep after OG placement and removal to empty stomach, strong resp, comfortable

## 2021-02-10 VITALS
HEIGHT: 61 IN | BODY MASS INDEX: 27.08 KG/M2 | RESPIRATION RATE: 18 BRPM | DIASTOLIC BLOOD PRESSURE: 78 MMHG | TEMPERATURE: 98 F | SYSTOLIC BLOOD PRESSURE: 167 MMHG | HEART RATE: 63 BPM | OXYGEN SATURATION: 96 % | WEIGHT: 143.4 LBS

## 2021-02-10 LAB
ALBUMIN SERPL-MCNC: 3.2 G/DL (ref 3.5–5.2)
ALBUMIN/GLOB SERPL: 0.9 G/DL
ALP SERPL-CCNC: 94 U/L (ref 39–117)
ALT SERPL W P-5'-P-CCNC: 9 U/L (ref 1–33)
ANION GAP SERPL CALCULATED.3IONS-SCNC: 9 MMOL/L (ref 5–15)
AST SERPL-CCNC: 27 U/L (ref 1–32)
BASOPHILS # BLD AUTO: 0.06 10*3/MM3 (ref 0–0.2)
BASOPHILS NFR BLD AUTO: 0.9 % (ref 0–1.5)
BILIRUB SERPL-MCNC: <0.2 MG/DL (ref 0–1.2)
BUN SERPL-MCNC: 3 MG/DL (ref 6–20)
BUN/CREAT SERPL: 4.5 (ref 7–25)
CALCIUM SPEC-SCNC: 9.3 MG/DL (ref 8.6–10.5)
CHLORIDE SERPL-SCNC: 104 MMOL/L (ref 98–107)
CO2 SERPL-SCNC: 26 MMOL/L (ref 22–29)
CREAT SERPL-MCNC: 0.67 MG/DL (ref 0.57–1)
DEPRECATED RDW RBC AUTO: 46.2 FL (ref 37–54)
EOSINOPHIL # BLD AUTO: 0.06 10*3/MM3 (ref 0–0.4)
EOSINOPHIL NFR BLD AUTO: 0.9 % (ref 0.3–6.2)
ERYTHROCYTE [DISTWIDTH] IN BLOOD BY AUTOMATED COUNT: 13.6 % (ref 12.3–15.4)
GFR SERPL CREATININE-BSD FRML MDRD: 91 ML/MIN/1.73
GLOBULIN UR ELPH-MCNC: 3.4 GM/DL
GLUCOSE SERPL-MCNC: 119 MG/DL (ref 65–99)
HCT VFR BLD AUTO: 32.5 % (ref 34–46.6)
HGB BLD-MCNC: 10.8 G/DL (ref 12–15.9)
IMM GRANULOCYTES # BLD AUTO: 0.03 10*3/MM3 (ref 0–0.05)
IMM GRANULOCYTES NFR BLD AUTO: 0.4 % (ref 0–0.5)
LYMPHOCYTES # BLD AUTO: 1.69 10*3/MM3 (ref 0.7–3.1)
LYMPHOCYTES NFR BLD AUTO: 24.3 % (ref 19.6–45.3)
MCH RBC QN AUTO: 30.9 PG (ref 26.6–33)
MCHC RBC AUTO-ENTMCNC: 33.2 G/DL (ref 31.5–35.7)
MCV RBC AUTO: 92.9 FL (ref 79–97)
MONOCYTES # BLD AUTO: 0.74 10*3/MM3 (ref 0.1–0.9)
MONOCYTES NFR BLD AUTO: 10.6 % (ref 5–12)
NEUTROPHILS NFR BLD AUTO: 4.37 10*3/MM3 (ref 1.7–7)
NEUTROPHILS NFR BLD AUTO: 62.9 % (ref 42.7–76)
NRBC BLD AUTO-RTO: 0 /100 WBC (ref 0–0.2)
PLATELET # BLD AUTO: 432 10*3/MM3 (ref 140–450)
PMV BLD AUTO: 10.5 FL (ref 6–12)
POTASSIUM SERPL-SCNC: 4.2 MMOL/L (ref 3.5–5.2)
PROT SERPL-MCNC: 6.6 G/DL (ref 6–8.5)
RBC # BLD AUTO: 3.5 10*6/MM3 (ref 3.77–5.28)
SODIUM SERPL-SCNC: 139 MMOL/L (ref 136–145)
WBC # BLD AUTO: 6.95 10*3/MM3 (ref 3.4–10.8)

## 2021-02-10 PROCEDURE — 25010000002 MORPHINE PER 10 MG: Performed by: SURGERY

## 2021-02-10 PROCEDURE — 80053 COMPREHEN METABOLIC PANEL: CPT | Performed by: SURGERY

## 2021-02-10 PROCEDURE — 25010000002 HEPARIN (PORCINE) PER 1000 UNITS: Performed by: SURGERY

## 2021-02-10 PROCEDURE — 85025 COMPLETE CBC W/AUTO DIFF WBC: CPT | Performed by: SURGERY

## 2021-02-10 PROCEDURE — 63710000001 ONDANSETRON PER 8 MG: Performed by: SURGERY

## 2021-02-10 RX ORDER — HYDROCODONE BITARTRATE AND ACETAMINOPHEN 7.5; 325 MG/1; MG/1
1 TABLET ORAL EVERY 6 HOURS PRN
Qty: 25 TABLET | Refills: 0 | Status: SHIPPED | OUTPATIENT
Start: 2021-02-10

## 2021-02-10 RX ORDER — HYDROCODONE BITARTRATE AND ACETAMINOPHEN 7.5; 325 MG/1; MG/1
1 TABLET ORAL EVERY 4 HOURS PRN
Status: DISCONTINUED | OUTPATIENT
Start: 2021-02-10 | End: 2021-02-10 | Stop reason: HOSPADM

## 2021-02-10 RX ADMIN — HYDROCODONE BITARTRATE AND ACETAMINOPHEN 1 TABLET: 7.5; 325 TABLET ORAL at 13:03

## 2021-02-10 RX ADMIN — MORPHINE SULFATE 4 MG: 4 INJECTION INTRAVENOUS at 03:19

## 2021-02-10 RX ADMIN — ONDANSETRON HYDROCHLORIDE 4 MG: 4 TABLET, FILM COATED ORAL at 11:04

## 2021-02-10 RX ADMIN — CARISOPRODOL 350 MG: 350 TABLET ORAL at 11:04

## 2021-02-10 RX ADMIN — MORPHINE SULFATE 4 MG: 4 INJECTION INTRAVENOUS at 05:15

## 2021-02-10 RX ADMIN — ALPRAZOLAM 1 MG: 1 TABLET ORAL at 11:04

## 2021-02-10 RX ADMIN — PANTOPRAZOLE SODIUM 40 MG: 40 TABLET, DELAYED RELEASE ORAL at 11:04

## 2021-02-10 RX ADMIN — HEPARIN SODIUM 5000 UNITS: 5000 INJECTION, SOLUTION INTRAVENOUS; SUBCUTANEOUS at 11:05

## 2021-02-10 RX ADMIN — MORPHINE SULFATE 4 MG: 4 INJECTION INTRAVENOUS at 01:37

## 2021-02-10 RX ADMIN — POTASSIUM CHLORIDE, DEXTROSE MONOHYDRATE AND SODIUM CHLORIDE 100 ML/HR: 150; 5; 900 INJECTION, SOLUTION INTRAVENOUS at 07:00

## 2021-02-10 NOTE — PROGRESS NOTES
Adult Nutrition  Assessment    Patient Name:  Emelyn Cook  YOB: 1965  MRN: [id number]  Admit Date:  2/9/2021    Assessment Date:  2/10/2021    Comments:  54yo female POD 1 s/p lap mragaret r/t gallstone pancreatitis, has KRISTEN drain. No PMH noted. Advanced to regular diet this am. Wt 143# w/ BMI 27.1. MD notes possible d/c later today. RD to follow hospital course.    Reason for Assessment     Row Name 02/10/21 0924          Reason for Assessment    Reason For Assessment  identified at risk by screening criteria     Diagnosis  gastrointestinal disease     Identified At Risk by Screening Criteria  MST SCORE 2+           Anthropometrics     Row Name 02/10/21 0510          Anthropometrics    Weight  65 kg (143 lb 6.4 oz)         Labs/Tests/Procedures/Meds     Row Name 02/10/21 0925          Labs/Procedures/Meds    Lab Results Reviewed  reviewed     Lab Results Comments  Alb 3.2L        Diagnostic Tests/Procedures    Diagnostic Test/Procedure Reviewed  reviewed     Diagnostic Test/Procedures Comments  s/p lap margaret        Medications    Pertinent Medications Reviewed  reviewed           Estimated/Assessed Needs     Row Name 02/10/21 0925          Calculation Measurements    Weight Used For Calculations  64.9 kg (143 lb)        Estimated/Assessed Needs    Additional Documentation  Calorie Requirements (Group);KCAL/KG (Group);Protein Requirements (Group);Fluid Requirements (Group)        Calorie Requirements    Estimated Calorie Requirement (kcal/day)  1600        KCAL/KG    KCAL/KG  25 Kcal/Kg (kcal)     25 Kcal/Kg (kcal)  1621.6        Protein Requirements    Weight Used For Protein Calculations  64.9 kg (143 lb)     Est Protein Requirement Amount (gms/kg)  1.0 gm protein     Estimated Protein Requirements (gms/day)  64.86        Fluid Requirements    Fluid Requirements (mL/day)  1600     RDA Method (mL)  1600         Nutrition Prescription Ordered     Row Name 02/10/21 0935          Nutrition  Prescription PO    Current PO Diet  Regular                 Electronically signed by:  Dayanara Anderson RD  02/10/21 09:28 CST

## 2021-02-10 NOTE — PLAN OF CARE
Goal Outcome Evaluation:     Progress: improving  Outcome Summary: vss, working on pain control with PRN IV morphine, brenna drained stripped q4 per orders, ambulating well in room, resting between care

## 2021-02-10 NOTE — PLAN OF CARE
Problem: Adult Inpatient Plan of Care  Goal: Plan of Care Review  Outcome: Ongoing, Progressing   Goal Outcome Evaluation:         POD 1 s/p lap margaret r/t gallstone pancreatitis, has KRISTEN drain. Advanced to regular diet this am, Alb 3.2L. Wt 143# w/ BMI 27.1. MD notes possible d/c later today. RD following.

## 2021-02-10 NOTE — PROGRESS NOTES
GENERAL SURGERY PROGRESS NOTE     LOS: 1 day     Chief Complaint:     Gallstone pancreatitis    Interval History:     Emelyn Cook is a 56 yo female presenting post op day 1 from a laparoscopic subtotal cholecystectomy for gallstone pancreatitis.     Pt was in no acute distress this morning. Pt received 1 mg of xanax last night at 2116. Incisions are dry and intact. KRISTEN drain has produced 85 ml of serosanguinous fluid. Pt reports a 9/10 sharp pain in right abdomen that is worsened by movement. Pain does improve considerably with medication. Pt was able to drink broth last night. Denies nausea or vomiting. Pt has not had any flatulence yet.  Pt is voiding spontaneously.     Medication Review:   estradiol, 2 mg, Oral, Nightly  heparin (porcine), 5,000 Units, Subcutaneous, Q12H  pantoprazole, 40 mg, Oral, Daily        dextrose 5 % and sodium chloride 0.9 % with KCl 20 mEq, 100 mL/hr, Last Rate: 100 mL/hr (02/10/21 0700)    Objective     Vital Signs:  Temp:  [97.1 °F (36.2 °C)-97.9 °F (36.6 °C)] 97.9 °F (36.6 °C)  Heart Rate:  [62-77] 62  Resp:  [16-18] 18  BP: (111-144)/(58-78) 141/69    Intake/Output Summary (Last 24 hours) at 2/10/2021 0742  Last data filed at 2/10/2021 0700  Gross per 24 hour   Intake 5056.1 ml   Output 2485 ml   Net 2571.1 ml       Physical Exam  Constitutional:       Appearance: Normal appearance.   HENT:      Head: Normocephalic and atraumatic.   Cardiovascular:      Rate and Rhythm: Normal rate and regular rhythm.      Pulses:           Radial pulses are 2+ on the right side and 2+ on the left side.      Heart sounds: No murmur.   Pulmonary:      Effort: Pulmonary effort is normal.      Breath sounds: Normal breath sounds. No wheezing, rhonchi or rales.   Abdominal:      General: There is distension.      Palpations: Abdomen is soft.      Tenderness: There is abdominal tenderness in the right upper quadrant and right lower quadrant.      Comments: KRISTEN tube intact, draining minimal  fluid    Abdominal incision clean, dry and intact    Tenderness is appropriate for post-op   Neurological:      Mental Status: She is alert.         Results Review:    Results from last 7 days   Lab Units 02/09/21  1204 02/05/21  1252   SODIUM mmol/L 138 140   POTASSIUM mmol/L 4.0 3.7   CHLORIDE mmol/L 103 104   CO2 mmol/L 23.0 28.0   BUN mg/dL 5* 5*   CREATININE mg/dL 0.70 0.80   GLUCOSE mg/dL 121* 107*   CALCIUM mg/dL 8.7 8.5*     Results from last 7 days   Lab Units 02/09/21  1204 02/05/21  1252   WBC 10*3/mm3 14.26* 13.05*   HEMOGLOBIN g/dL 11.7* 10.9*   HEMATOCRIT % 35.3 32.0*   PLATELETS 10*3/mm3 632* 268       Assessment:    Gallstone pancreatitis      Pt is post-op day 1 from laparoscopic subtotal cholecystectomy. Her condition is improving. Pain may be improved by PO route vs. PRN.     Plan:    Subtotal cholecystectomy  -Consider switch to PO pain medication  -Change dressings as needed  -Advance diet as tolerated  -Encourage ambulation\  -Continue heparin for thromboembolic prophalyxis        This document has been electronically signed by Enmanuel Leonard, Medical Student on February 10, 2021 07:42 CST

## 2021-02-10 NOTE — DISCHARGE SUMMARY
Discharge Summary    Date of Admission: 2/9/2021  Date of Discharge:  2/10/2021  Service: General Surgery  Attending: Salvatore Hernandez    Procedures Performed: Procedure(s):  subtotal cholecystectomy  Consults:   Consults     No orders found for last 30 day(s).        Discharge Diagnoses:   Active Hospital Problems    Diagnosis   • **Gallstone pancreatitis       Hospital Course: 55-year-old female who was admitted the date of her surgery.  Patient underwent subtotal laparoscopic converted to an open cholecystectomy for gallstone pancreatitis.  Postop the patient is tolerating a regular diet and pain is well controlled.  She has a KRISTEN drain in place that she has been instructed in its care and she will record the output and will address that in the office when she follows up with us.  She can resume her home medications.  She was given 20 Norco 7.5 tablets to be done on apparent basis for pain    Discharge Condition: Postoperatively Stable    Discharge Medications:     Your medication list      CONTINUE taking these medications      Instructions Last Dose Given Next Dose Due   ALPRAZolam 1 MG tablet  Commonly known as: XANAX      Take 1 mg by mouth 2 (Two) Times a Day As Needed for Anxiety.       carisoprodol 350 MG tablet  Commonly known as: SOMA      Take 350 mg by mouth 4 (Four) Times a Day As Needed for Muscle Spasms.       estradiol 2 MG tablet  Commonly known as: ESTRACE      Take 2 mg by mouth Daily.       loratadine 10 MG tablet  Commonly known as: CLARITIN      Take 10 mg by mouth Every Night.       ondansetron 4 MG tablet  Commonly known as: ZOFRAN      Take 4 mg by mouth Every 8 (Eight) Hours As Needed for Nausea or Vomiting.       pantoprazole 40 MG EC tablet  Commonly known as: PROTONIX      Take 40 mg by mouth Daily.       simvastatin 20 MG tablet  Commonly known as: ZOCOR      Take 20 mg by mouth Every Night.       Zolpidem Tartrate 10 MG sublingual tablet      Place  under the tongue.                 Activity as instructed by Dr. Hernandez.  No lifting over 20 lbs until seen in the office.  Shower as instructed.      Regular Diet      Follow-up Appointments            This document has been electronically signed by Salvatore Hernandez MD on February 10, 2021 13:02 CST

## 2021-02-10 NOTE — NURSING NOTE
Patient educated on how to empty, measures and compress and decompress KRISTEN drain. Educated on sutures and staple. Education on signs and symptoms of infection on skin and drainage with color and odor. Instructed to contact medical assistance if any symptoms occur or with any questions. Patient verbalize understanding with teach back.

## 2021-02-10 NOTE — PROGRESS NOTES
"  Subjective:  Postop day #1 open subtotal cholecystectomy for gallstone pancreatitis.  Labs pending this morning.  Patient did well overnight.  She is tolerating her liquid diet currently.  Denies any nausea and vomiting.  Pain is well controlled.  KRISTEN output is about 85 cc since surgery.     /69 (BP Location: Right arm, Patient Position: Lying)   Pulse 62   Temp 97.9 °F (36.6 °C) (Oral)   Resp 18   Ht 154.9 cm (61\")   Wt 65 kg (143 lb 6.4 oz)   SpO2 96%   BMI 27.10 kg/m²     Lab Results (last 24 hours)     Procedure Component Value Units Date/Time    Manual Differential [[id number]]  (Abnormal) Collected: 02/09/21 1204    Specimen: Blood Updated: 02/09/21 1324     Neutrophil % 91.0 %      Lymphocyte % 8.0 %      Monocyte % 1.0 %      Neutrophils Absolute 12.98 10*3/mm3      Lymphocytes Absolute 1.14 10*3/mm3      Monocytes Absolute 0.14 10*3/mm3      RBC Morphology Normal     WBC Morphology Normal     Platelet Estimate Adequate    Comprehensive Metabolic Panel [[id number]]  (Abnormal) Collected: 02/09/21 1204    Specimen: Blood Updated: 02/09/21 1247     Glucose 121 mg/dL      BUN 5 mg/dL      Creatinine 0.70 mg/dL      Sodium 138 mmol/L      Potassium 4.0 mmol/L      Comment: Slight hemolysis detected by analyzer. Results may be affected.        Chloride 103 mmol/L      CO2 23.0 mmol/L      Calcium 8.7 mg/dL      Total Protein 7.0 g/dL      Albumin 3.20 g/dL      ALT (SGPT) 8 U/L      AST (SGOT) 36 U/L      Alkaline Phosphatase 110 U/L      Total Bilirubin <0.2 mg/dL      eGFR Non African Amer 86 mL/min/1.73      Globulin 3.8 gm/dL      A/G Ratio 0.8 g/dL      BUN/Creatinine Ratio 7.1     Anion Gap 12.0 mmol/L     Narrative:      GFR Normal >60  Chronic Kidney Disease <60  Kidney Failure <15      CBC & Differential [[id number]]  (Abnormal) Collected: 02/09/21 1204    Specimen: Blood Updated: 02/09/21 1231    Narrative:      The following orders were created for panel order CBC & Differential.  Procedure  "                              Abnormality         Status                     ---------                               -----------         ------                     CBC Auto Differential[[id number]]        Abnormal            Final result                 Please view results for these tests on the individual orders.    CBC Auto Differential [[id number]]  (Abnormal) Collected: 02/09/21 1204    Specimen: Blood Updated: 02/09/21 1231     WBC 14.26 10*3/mm3      RBC 3.65 10*6/mm3      Hemoglobin 11.7 g/dL      Hematocrit 35.3 %      MCV 96.7 fL      MCH 32.1 pg      MCHC 33.1 g/dL      RDW 13.6 %      RDW-SD 48.9 fl      MPV 9.1 fL      Platelets 632 10*3/mm3      Comment: SPECIMEN REANALYZED TO CONFIRM PLATELET COUNT       Tissue Pathology Exam [[id number]] Collected: 02/09/21 0823    Specimen: Tissue from Gallbladder Updated: 02/09/21 1000          Current Medications:  Current Facility-Administered Medications   Medication Dose Route Frequency Provider Last Rate Last Admin   • ALPRAZolam (XANAX) tablet 1 mg  1 mg Oral BID PRN Salvatore Hernandez MD   1 mg at 02/09/21 2116   • carisoprodol (SOMA) tablet 350 mg  350 mg Oral 4x Daily PRN Salvatore Hernandez MD       • dextrose 5 % and sodium chloride 0.9 % with KCl 20 mEq/L infusion  100 mL/hr Intravenous Continuous Salvatore Hernandez  mL/hr at 02/10/21 0700 100 mL/hr at 02/10/21 0700   • estradiol (ESTRACE) tablet 2 mg  2 mg Oral Nightly Salvatore Hernandez MD   2 mg at 02/09/21 2116   • heparin (porcine) 5000 UNIT/ML injection 5,000 Units  5,000 Units Subcutaneous Q12H Salvatore Hernandez MD       • morphine injection 4 mg  4 mg Intravenous Q2H PRN Salvatore Hernandez MD   4 mg at 02/10/21 0515    And   • naloxone (NARCAN) injection 0.4 mg  0.4 mg Intravenous Q5 Min PRN Salvatore Hernandez MD       • ondansetron (ZOFRAN) tablet 4 mg  4 mg Oral Q8H PRN Salvatore Hernandez MD       • pantoprazole (PROTONIX) EC tablet 40 mg  40 mg Oral Daily Salvatore Hernandez MD            Prior to admission medications:  Medications Prior to Admission   Medication Sig Dispense Refill Last Dose   • ALPRAZolam (XANAX) 1 MG tablet Take 1 mg by mouth 2 (Two) Times a Day As Needed for Anxiety.   2/9/2021 at 0400   • carisoprodol (SOMA) 350 MG tablet Take 350 mg by mouth 4 (Four) Times a Day As Needed for Muscle Spasms.   2/8/2021 at 2100   • estradiol (ESTRACE) 2 MG tablet Take 2 mg by mouth Daily.   2/8/2021 at 2100   • loratadine (CLARITIN) 10 MG tablet Take 10 mg by mouth Every Night.   Past Week at Unknown time   • ondansetron (ZOFRAN) 4 MG tablet Take 4 mg by mouth Every 8 (Eight) Hours As Needed for Nausea or Vomiting.   2/9/2021 at 0400   • pantoprazole (PROTONIX) 40 MG EC tablet Take 40 mg by mouth Daily.   2/9/2021 at 0400   • simvastatin (ZOCOR) 20 MG tablet Take 20 mg by mouth Every Night.   Past Week at 2100   • Zolpidem Tartrate 10 MG sublingual tablet Place  under the tongue.   2/8/2021 at 2100       Physical exam: Alert nontoxic nonicteric  Abdomen soft dressing intact  KRISTEN with bile tinged drainage    Assessment : Doing well postoperatively    Plan: Decrease maintenance IV fluids  Advance diet  Follow-up labs  Likely will need to go home with KRISTEN drain.  Will reevaluate later today possible discharge

## 2021-02-11 NOTE — PAYOR COMM NOTE
"Marianne Monroy  [facility]  P: [phone]  F: [phone]    Ref#[id number]    Emelyn Cook (55 y.o. Female)     Date of Birth Social Security Number Address Home Phone MRN    1965  [id number] [address] [phone] [id number]    Sikhism Marital Status          Jew        Admission Date Admission Type Admitting Provider Attending Provider Department, Room/Bed    2/9/21 Elective Salvatore Hernandez MD  [facility], 377/1    Discharge Date Discharge Disposition Discharge Destination        2/10/2021 Home or Self Care              Attending Provider: (none)   Allergies: Levaquin [Levofloxacin]    Isolation: None   Infection: None   Code Status: Prior    Ht: 154.9 cm (61\")   Wt: 65 kg (143 lb 6.4 oz)    Admission Cmt: None   Principal Problem: Gallstone pancreatitis [K85.10]                 Active Insurance as of 2/9/2021     Primary Coverage     Payor Plan Insurance Group Employer/Plan Group    [employer] PPO [id number]     Payor Plan Address Payor Plan Phone Number Payor Plan Fax Number Effective Dates    [id number] [phone]  10/12/2020 - None Entered    [facility] [id number]       Subscriber Name Subscriber Birth Date Member ID       ROHITH COOK 10/23/1968 [id number]                 Emergency Contacts      (Rel.) Home Phone Work Phone Mobile Phone    LOPEZ LOVELL (Relative) [phone] -- [phone]    ROHITH COOK (Spouse) [phone] -- [phone]               Discharge Summary      Salvatore Hernandez MD at 02/10/21 1302          Discharge Summary    Date of Admission: 2/9/2021  Date of Discharge:  2/10/2021  Service: General Surgery  Attending: Salvatore Hernandez    Procedures Performed: Procedure(s):  subtotal cholecystectomy  Consults:   Consults     No orders found for last 30 day(s).        Discharge Diagnoses:   Active Hospital Problems    " Diagnosis   • **Gallstone pancreatitis       Hospital Course: 55-year-old female who was admitted the date of her surgery.  Patient underwent subtotal laparoscopic converted to an open cholecystectomy for gallstone pancreatitis.  Postop the patient is tolerating a regular diet and pain is well controlled.  She has a KRISTEN drain in place that she has been instructed in its care and she will record the output and will address that in the office when she follows up with us.  She can resume her home medications.  She was given 20 Norco 7.5 tablets to be done on apparent basis for pain    Discharge Condition: Postoperatively Stable    Discharge Medications:     Your medication list      CONTINUE taking these medications      Instructions Last Dose Given Next Dose Due   ALPRAZolam 1 MG tablet  Commonly known as: XANAX      Take 1 mg by mouth 2 (Two) Times a Day As Needed for Anxiety.       carisoprodol 350 MG tablet  Commonly known as: SOMA      Take 350 mg by mouth 4 (Four) Times a Day As Needed for Muscle Spasms.       estradiol 2 MG tablet  Commonly known as: ESTRACE      Take 2 mg by mouth Daily.       loratadine 10 MG tablet  Commonly known as: CLARITIN      Take 10 mg by mouth Every Night.       ondansetron 4 MG tablet  Commonly known as: ZOFRAN      Take 4 mg by mouth Every 8 (Eight) Hours As Needed for Nausea or Vomiting.       pantoprazole 40 MG EC tablet  Commonly known as: PROTONIX      Take 40 mg by mouth Daily.       simvastatin 20 MG tablet  Commonly known as: ZOCOR      Take 20 mg by mouth Every Night.       Zolpidem Tartrate 10 MG sublingual tablet      Place  under the tongue.                Activity as instructed by Dr. Hernandez.  No lifting over 20 lbs until seen in the office.  Shower as instructed.      Regular Diet      Follow-up Appointments            This document has been electronically signed by Salvatore Hernandez MD on February 10, 2021 13:02 CST              Electronically signed by Salvatore Hernandez  MD WEI at 02/10/21 1304       Discharge Order (From admission, onward)     Start     Ordered    02/10/21 1604  Discharge patient  Once     Expected Discharge Date: 02/10/21    Discharge Disposition: Home or Self Care    Physician of Record for Attribution - Please select from Treatment Team: LUCIANO BURGOS [[id number]]    Review needed by CMO to determine Physician of Record: No    Please choose which facility the patient is currently admitted if they are being discharged to another facility or unit.: [facility]       Question Answer Comment   Physician of Record for Attribution - Please select from Treatment Team LUCIANO BURGOS    Review needed by CMO to determine Physician of Record No    Please choose which facility the patient is currently admitted if they are being discharged to another facility or unit. [facility]        02/10/21 1603

## 2021-02-12 LAB
LAB AP CASE REPORT: NORMAL
PATH REPORT.FINAL DX SPEC: NORMAL

## 2021-02-22 ENCOUNTER — OFFICE VISIT (OUTPATIENT)
Dept: SURGERY | Facility: CLINIC | Age: 56
End: 2021-02-22

## 2021-02-22 VITALS
HEIGHT: 61 IN | HEART RATE: 82 BPM | BODY MASS INDEX: 26.43 KG/M2 | WEIGHT: 140 LBS | TEMPERATURE: 96.8 F | DIASTOLIC BLOOD PRESSURE: 70 MMHG | SYSTOLIC BLOOD PRESSURE: 118 MMHG

## 2021-02-22 DIAGNOSIS — K85.10 GALLSTONE PANCREATITIS: Primary | ICD-10-CM

## 2021-02-22 PROCEDURE — 99024 POSTOP FOLLOW-UP VISIT: CPT | Performed by: SURGERY

## 2021-02-22 RX ORDER — GABAPENTIN 100 MG/1
100 CAPSULE ORAL DAILY PRN
COMMUNITY
Start: 2021-01-14

## 2021-02-22 NOTE — PROGRESS NOTES
55-year-old female who is now 2 weeks out from her open subtotal cholecystectomy with drain placement for gallstone pancreatitis.  Patient is already had to have her sutures removed when she was home by a nurse practitioner.  With the weather last week we talked her by phone and it was easy for them to remove portion of her staples already so they did.  Patient had significant bowel drainage out until approximately 3 days ago.  Over the past 3 days she has had less than 20 cc out per 24 hours.  Last 24 hours she has had just a few cc out.  It is lightly bile tinged but clear the volume is decreased.  She has no fever no chills.  She is tolerating regular diet.    We remove the KRISTEN drain without any difficulty.  Placed a dressing instructed in local wound care.  We remove the rest of her staples as well.  We discussed further wound care with her.  She will follow-up with us in 1 week or sooner if she has any other concerns or questions    I again went over with her what subtotal cholecystectomy was what we found at the time of surgery as well as with her mother.  Answered all of their questions.

## 2021-03-03 ENCOUNTER — OFFICE VISIT (OUTPATIENT)
Dept: SURGERY | Facility: CLINIC | Age: 56
End: 2021-03-03

## 2021-03-03 VITALS
SYSTOLIC BLOOD PRESSURE: 114 MMHG | BODY MASS INDEX: 26.24 KG/M2 | HEART RATE: 72 BPM | HEIGHT: 61 IN | TEMPERATURE: 96.9 F | WEIGHT: 139 LBS | DIASTOLIC BLOOD PRESSURE: 72 MMHG

## 2021-03-03 DIAGNOSIS — K85.10 GALLSTONE PANCREATITIS: Primary | ICD-10-CM

## 2021-03-03 PROCEDURE — 99024 POSTOP FOLLOW-UP VISIT: CPT | Performed by: SURGERY

## 2021-03-03 NOTE — PROGRESS NOTES
3 weeks now status post open subtotal cholecystectomy for history of gallstone pancreatitis.  Patient said her drain out now for over a week.  She is doing slowly improving.  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    She is eating and having normal bowel function she has some incisional tenderness as well as numbness below her incision is nonicteric her abdomen is soft incision is intact appears to be healing appropriately    Discussed further pain management.  Was suggested Tylenol around-the-clock without waiting to if she has any symptoms.  She can also restart gabapentin that she is used in the past for back issues.  Will use Motrin on a as needed basis or she can also take that on a routine basis as well we talked about the dose of both Tylenol and Motrin.  We will extend her leave from work for another 2 weeks.  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  She will follow-up with us sooner if she has any concerns or questions

## 2021-03-03 NOTE — PATIENT INSTRUCTIONS
"BMI for Adults  What is BMI?  Body mass index (BMI) is a number that is calculated from a person's weight and height. BMI can help estimate how much of a person's weight is composed of fat. BMI does not measure body fat directly. Rather, it is an alternative to procedures that directly measure body fat, which can be difficult and expensive.  BMI can help identify people who may be at higher risk for certain medical problems.  What are BMI measurements used for?  BMI is used as a screening tool to identify possible weight problems. It helps determine whether a person is obese, overweight, a healthy weight, or underweight.  BMI is useful for:  · Identifying a weight problem that may be related to a medical condition or may increase the risk for medical problems.  · Promoting changes, such as changes in diet and exercise, to help reach a healthy weight. BMI screening can be repeated to see if these changes are working.  How is BMI calculated?  BMI involves measuring your weight in relation to your height. Both height and weight are measured, and the BMI is calculated from those numbers. This can be done either in English (U.S.) or metric measurements. Note that charts and online BMI calculators are available to help you find your BMI quickly and easily without having to do these calculations yourself.  To calculate your BMI in English (U.S.) measurements:    1. Measure your weight in pounds (lb).  2. Multiply the number of pounds by 703.  ? For example, for a person who weighs 180 lb, multiply that number by 703, which equals 126,540.  3. Measure your height in inches. Then multiply that number by itself to get a measurement called \"inches squared.\"  ? For example, for a person who is 70 inches tall, the \"inches squared\" measurement is 70 inches x 70 inches, which equals 4,900 inches squared.  4. Divide the total from step 2 (number of lb x 703) by the total from step 3 (inches squared): 126,540 ÷ 4,900 = 25.8. This is " "your BMI.  To calculate your BMI in metric measurements:  1. Measure your weight in kilograms (kg).  2. Measure your height in meters (m). Then multiply that number by itself to get a measurement called \"meters squared.\"  ? For example, for a person who is 1.75 m tall, the \"meters squared\" measurement is 1.75 m x 1.75 m, which is equal to 3.1 meters squared.  3. Divide the number of kilograms (your weight) by the meters squared number. In this example: 70 ÷ 3.1 = 22.6. This is your BMI.  What do the results mean?  BMI charts are used to identify whether you are underweight, normal weight, overweight, or obese. The following guidelines will be used:  · Underweight: BMI less than 18.5.  · Normal weight: BMI between 18.5 and 24.9.  · Overweight: BMI between 25 and 29.9.  · Obese: BMI of 30 or above.  Keep these notes in mind:  · Weight includes both fat and muscle, so someone with a muscular build, such as an athlete, may have a BMI that is higher than 24.9. In cases like these, BMI is not an accurate measure of body fat.  · To determine if excess body fat is the cause of a BMI of 25 or higher, further assessments may need to be done by a health care provider.  · BMI is usually interpreted in the same way for men and women.  Where to find more information  For more information about BMI, including tools to quickly calculate your BMI, go to these websites:  · Centers for Disease Control and Prevention: www.cdc.gov  · American Heart Association: www.heart.org  · National Heart, Lung, and Blood [place]: www.nhlbi.nih.gov  Summary  · Body mass index (BMI) is a number that is calculated from a person's weight and height.  · BMI may help estimate how much of a person's weight is composed of fat. BMI can help identify those who may be at higher risk for certain medical problems.  · BMI can be measured using English measurements or metric measurements.  · BMI charts are used to identify whether you are underweight, normal " weight, overweight, or obese.  This information is not intended to replace advice given to you by your health care provider. Make sure you discuss any questions you have with your health care provider.  Document Revised: 09/09/2020 Document Reviewed: 07/17/2020  Elsevier Patient Education © 2020 Elsevier Inc.

## 2021-03-17 ENCOUNTER — OFFICE VISIT (OUTPATIENT)
Dept: SURGERY | Facility: CLINIC | Age: 56
End: 2021-03-17

## 2021-03-17 VITALS
TEMPERATURE: 97.2 F | WEIGHT: 139.8 LBS | BODY MASS INDEX: 26.39 KG/M2 | HEIGHT: 61 IN | DIASTOLIC BLOOD PRESSURE: 78 MMHG | SYSTOLIC BLOOD PRESSURE: 116 MMHG | HEART RATE: 80 BPM

## 2021-03-17 DIAGNOSIS — K85.10 GALLSTONE PANCREATITIS: Primary | ICD-10-CM

## 2021-03-17 PROCEDURE — 99024 POSTOP FOLLOW-UP VISIT: CPT | Performed by: SURGERY

## 2021-03-17 NOTE — PROGRESS NOTES
55-year-old female who is now nearly 6 weeks out from her open cholecystectomy for gallstone pancreatitis.  She has improved.  She likes to go back to work at one local [employer] in [place] part-time for a few days.  Tolerating regular diet and normal bowel function.    Nonicteric  Abdomen soft  Incisions are all clean and intact she is a good support abdominal wall      Overall doing well.  Expect her to continue to improve from here on out.  She will follow-up with us on apparent basis she has any concerns or questions.

## 2021-03-17 NOTE — PATIENT INSTRUCTIONS
"BMI for Adults  What is BMI?  Body mass index (BMI) is a number that is calculated from a person's weight and height. BMI can help estimate how much of a person's weight is composed of fat. BMI does not measure body fat directly. Rather, it is an alternative to procedures that directly measure body fat, which can be difficult and expensive.  BMI can help identify people who may be at higher risk for certain medical problems.  What are BMI measurements used for?  BMI is used as a screening tool to identify possible weight problems. It helps determine whether a person is obese, overweight, a healthy weight, or underweight.  BMI is useful for:  · Identifying a weight problem that may be related to a medical condition or may increase the risk for medical problems.  · Promoting changes, such as changes in diet and exercise, to help reach a healthy weight. BMI screening can be repeated to see if these changes are working.  How is BMI calculated?  BMI involves measuring your weight in relation to your height. Both height and weight are measured, and the BMI is calculated from those numbers. This can be done either in English (U.S.) or metric measurements. Note that charts and online BMI calculators are available to help you find your BMI quickly and easily without having to do these calculations yourself.  To calculate your BMI in English (U.S.) measurements:    1. Measure your weight in pounds (lb).  2. Multiply the number of pounds by 703.  ? For example, for a person who weighs 180 lb, multiply that number by 703, which equals 126,540.  3. Measure your height in inches. Then multiply that number by itself to get a measurement called \"inches squared.\"  ? For example, for a person who is 70 inches tall, the \"inches squared\" measurement is 70 inches x 70 inches, which equals 4,900 inches squared.  4. Divide the total from step 2 (number of lb x 703) by the total from step 3 (inches squared): 126,540 ÷ 4,900 = 25.8. This is " "your BMI.  To calculate your BMI in metric measurements:  1. Measure your weight in kilograms (kg).  2. Measure your height in meters (m). Then multiply that number by itself to get a measurement called \"meters squared.\"  ? For example, for a person who is 1.75 m tall, the \"meters squared\" measurement is 1.75 m x 1.75 m, which is equal to 3.1 meters squared.  3. Divide the number of kilograms (your weight) by the meters squared number. In this example: 70 ÷ 3.1 = 22.6. This is your BMI.  What do the results mean?  BMI charts are used to identify whether you are underweight, normal weight, overweight, or obese. The following guidelines will be used:  · Underweight: BMI less than 18.5.  · Normal weight: BMI between 18.5 and 24.9.  · Overweight: BMI between 25 and 29.9.  · Obese: BMI of 30 or above.  Keep these notes in mind:  · Weight includes both fat and muscle, so someone with a muscular build, such as an athlete, may have a BMI that is higher than 24.9. In cases like these, BMI is not an accurate measure of body fat.  · To determine if excess body fat is the cause of a BMI of 25 or higher, further assessments may need to be done by a health care provider.  · BMI is usually interpreted in the same way for men and women.  Where to find more information  For more information about BMI, including tools to quickly calculate your BMI, go to these websites:  · Centers for Disease Control and Prevention: www.cdc.gov  · American Heart Association: www.heart.org  · National Heart, Lung, and Blood [place]: www.nhlbi.nih.gov  Summary  · Body mass index (BMI) is a number that is calculated from a person's weight and height.  · BMI may help estimate how much of a person's weight is composed of fat. BMI can help identify those who may be at higher risk for certain medical problems.  · BMI can be measured using English measurements or metric measurements.  · BMI charts are used to identify whether you are underweight, normal " weight, overweight, or obese.  This information is not intended to replace advice given to you by your health care provider. Make sure you discuss any questions you have with your health care provider.  Document Revised: 09/09/2020 Document Reviewed: 07/17/2020  Elsevier Patient Education © 2021 Elsevier Inc.

## 2021-05-05 ENCOUNTER — OFFICE VISIT (OUTPATIENT)
Dept: GASTROENTEROLOGY | Facility: CLINIC | Age: 56
End: 2021-05-05

## 2021-05-05 VITALS
HEIGHT: 61 IN | DIASTOLIC BLOOD PRESSURE: 88 MMHG | HEART RATE: 74 BPM | SYSTOLIC BLOOD PRESSURE: 138 MMHG | BODY MASS INDEX: 26.7 KG/M2 | WEIGHT: 141.4 LBS

## 2021-05-05 DIAGNOSIS — R11.0 NAUSEA: ICD-10-CM

## 2021-05-05 DIAGNOSIS — R10.10 UPPER ABDOMINAL PAIN: Primary | ICD-10-CM

## 2021-05-05 PROCEDURE — 99203 OFFICE O/P NEW LOW 30 MIN: CPT | Performed by: NURSE PRACTITIONER

## 2021-05-05 RX ORDER — DEXTROSE AND SODIUM CHLORIDE 5; .45 G/100ML; G/100ML
30 INJECTION, SOLUTION INTRAVENOUS CONTINUOUS PRN
Status: CANCELLED | OUTPATIENT
Start: 2021-05-26

## 2021-05-05 RX ORDER — PANTOPRAZOLE SODIUM 40 MG/1
40 TABLET, DELAYED RELEASE ORAL DAILY
Qty: 30 TABLET | Refills: 5 | Status: SHIPPED | OUTPATIENT
Start: 2021-05-05

## 2021-05-05 NOTE — PATIENT INSTRUCTIONS
Upper Endoscopy, Adult  Upper endoscopy is a procedure to look inside the upper GI (gastrointestinal) tract. The upper GI tract is made up of:  · The part of the body that moves food from your mouth to your stomach (esophagus).  · The stomach.  · The first part of your small intestine (duodenum).  This procedure is also called esophagogastroduodenoscopy (EGD) or gastroscopy. In this procedure, your health care provider passes a thin, flexible tube (endoscope) through your mouth and down your esophagus into your stomach. A small camera is attached to the end of the tube. Images from the camera appear on a monitor in the exam room. During this procedure, your health care provider may also remove a small piece of tissue to be sent to a lab and examined under a microscope (biopsy).  Your health care provider may do an upper endoscopy to diagnose cancers of the upper GI tract. You may also have this procedure to find the cause of other conditions, such as:  · Stomach pain.  · Heartburn.  · Pain or problems when swallowing.  · Nausea and vomiting.  · Stomach bleeding.  · Stomach ulcers.  Tell a health care provider about:  · Any allergies you have.  · All medicines you are taking, including vitamins, herbs, eye drops, creams, and over-the-counter medicines.  · Any problems you or family members have had with anesthetic medicines.  · Any blood disorders you have.  · Any surgeries you have had.  · Any medical conditions you have.  · Whether you are pregnant or may be pregnant.  What are the risks?  Generally, this is a safe procedure. However, problems may occur, including:  · Infection.  · Bleeding.  · Allergic reactions to medicines.  · A tear or hole (perforation) in the esophagus, stomach, or duodenum.  What happens before the procedure?  Staying hydrated  Follow instructions from your health care provider about hydration, which may include:  · Up to 2 hours before the procedure - you may continue to drink clear  liquids, such as water, clear fruit juice, black coffee, and plain tea.    Eating and drinking restrictions  Follow instructions from your health care provider about eating and drinking, which may include:  · 8 hours before the procedure - stop eating heavy meals or foods, such as meat, fried foods, or fatty foods.  · 6 hours before the procedure - stop eating light meals or foods, such as toast or cereal.  · 6 hours before the procedure - stop drinking milk or drinks that contain milk.  · 2 hours before the procedure - stop drinking clear liquids.  Medicines  Ask your health care provider about:  · Changing or stopping your regular medicines. This is especially important if you are taking diabetes medicines or blood thinners.  · Taking medicines such as aspirin and ibuprofen. These medicines can thin your blood. Do not take these medicines unless your health care provider tells you to take them.  · Taking over-the-counter medicines, vitamins, herbs, and supplements.  General instructions  · Plan to have someone take you home from the hospital or clinic.  · If you will be going home right after the procedure, plan to have someone with you for 24 hours.  · Ask your health care provider what steps will be taken to help prevent infection.  What happens during the procedure?    · An IV will be inserted into one of your veins.  · You may be given one or more of the following:  ? A medicine to help you relax (sedative).  ? A medicine to numb the throat (local anesthetic).  · You will lie on your left side on an exam table.  · Your health care provider will pass the endoscope through your mouth and down your esophagus.  · Your health care provider will use the scope to check the inside of your esophagus, stomach, and duodenum. Biopsies may be taken.  · The endoscope will be removed.  The procedure may vary among health care providers and hospitals.  What happens after the procedure?  · Your blood pressure, heart rate,  breathing rate, and blood oxygen level will be monitored until you leave the hospital or clinic.  · Do not drive for 24 hours if you were given a sedative during your procedure.  · When your throat is no longer numb, you may be given some fluids to drink.  · It is up to you to get the results of your procedure. Ask your health care provider, or the department that is doing the procedure, when your results will be ready.  Summary  · Upper endoscopy is a procedure to look inside the upper GI tract.  · During the procedure, an IV will be inserted into one of your veins. You may be given a medicine to help you relax.  · A medicine will be used to numb your throat.  · The endoscope will be passed through your mouth and down your esophagus.  This information is not intended to replace advice given to you by your health care provider. Make sure you discuss any questions you have with your health care provider.  Document Revised: 06/12/2019 Document Reviewed: 05/20/2019  [employer] Patient Education © 2021 [employer]

## 2021-05-05 NOTE — PROGRESS NOTES
"Chief Complaint   Patient presents with   • Abdominal Pain   • Diarrhea       Subjective    Emelyn Cook is a 55 y.o. female. she is here today for follow-up.  55-year-old female presents to discuss worsening upper abdominal pain nausea.  Reports at onset she had diarrhea but that has since resolved and reports bowel movements are normal about 1 time per day now.  She had cholecystectomy 2/9/2021 after gallstone induced pancreatitis requiring hospitalization for 9 days and [facility].  States she was given Protonix at hospital discharge but stopped that about 3 months ago.    Abdominal Pain  This is a new problem. The current episode started more than 1 month ago (about 6 weeks ago ). The problem occurs intermittently. The problem has been waxing and waning. The pain is located in the generalized abdominal region. The pain is moderate. Associated symptoms include belching and diarrhea. Pertinent negatives include no anorexia, arthralgias, constipation, dysuria, fever, flatus, frequency, headaches, hematochezia, hematuria, nausea or vomiting. The pain is relieved by nothing. The treatment provided moderate relief.   Diarrhea   This is a new problem. The current episode started 1 to 4 weeks ago. The problem has been resolved. Associated symptoms include abdominal pain. Pertinent negatives include no arthralgias, chills, fever, headaches, increased  flatus or vomiting.     Plan; schedule patient for EGD due to concern for peptic ulcer.  We will start patient back on Protonix daily recommend she avoid gastric irritants and follow standard antireflux measures.  Discussed colonoscopy but patient reports she had normal colonoscopy in [place] will obtain results of that test.         The following portions of the patient's history were reviewed and updated as appropriate:   Past Medical History:   Diagnosis Date   • Anxiety    • Back pain     \"BULGING DISC\"   • Depression    • " Pancreatitis due to common bile duct stone      Past Surgical History:   Procedure Laterality Date   •  SECTION     • CHOLECYSTECTOMY WITH INTRAOPERATIVE CHOLANGIOGRAM N/A 2021    Procedure: Laparoscopic attempted, converted to open subtotal cholecystectomy;  Surgeon: Salvatore Hernandez MD;  Location: [facility];  Service: General;  Laterality: N/A;   • DIAGNOSTIC LAPAROSCOPY     • DILATATION AND CURETTAGE     • HYSTERECTOMY       Family History   Problem Relation Age of Onset   • Diabetes Sister    • Diabetes Maternal Aunt      OB History    No obstetric history on file.       Prior to Admission medications    Medication Sig Start Date End Date Taking? Authorizing Provider   ALPRAZolam (XANAX) 1 MG tablet Take 1 mg by mouth 2 (Two) Times a Day As Needed for Anxiety.   Yes Mehul Fair MD   carisoprodol (SOMA) 350 MG tablet Take 350 mg by mouth 4 (Four) Times a Day As Needed for Muscle Spasms.   Yes Mehul Fair MD   estradiol (ESTRACE) 2 MG tablet Take 2 mg by mouth Daily.   Yes Mehul Fair MD   HYDROcodone-acetaminophen (NORCO) 7.5-325 MG per tablet Take 1 tablet by mouth Every 6 (Six) Hours As Needed for Moderate Pain. 2/10/21  Yes Salvatore Hernandez MD   loratadine (CLARITIN) 10 MG tablet Take 10 mg by mouth Every Night.   Yes Mehul Fair MD   ondansetron (ZOFRAN) 4 MG tablet Take 4 mg by mouth Every 8 (Eight) Hours As Needed for Nausea or Vomiting.   Yes Mehul Fair MD   pantoprazole (PROTONIX) 40 MG EC tablet Take 40 mg by mouth Daily.   Yes Mehul Fair MD   Zolpidem Tartrate 10 MG sublingual tablet Place 0.5 tablets under the tongue.   Yes Mehul Fair MD   gabapentin (NEURONTIN) 100 MG capsule  21   Mehul Fair MD   simvastatin (ZOCOR) 20 MG tablet Take 20 mg by mouth Every Night.    Mehul Fair MD     Allergies   Allergen Reactions   • Levaquin [Levofloxacin] Mental Status Change     Social History  "    Socioeconomic History   • Marital status:      Spouse name: Not on file   • Number of children: Not on file   • Years of education: Not on file   • Highest education level: Not on file   Tobacco Use   • Smoking status: Former Smoker   • Smokeless tobacco: Never Used   • Tobacco comment: QUIT 20 YRS AGO   Vaping Use   • Vaping Use: Never used   Substance and Sexual Activity   • Alcohol use: Yes     Comment: ON OCC.   • Drug use: Never   • Sexual activity: Yes     Partners: Female     Birth control/protection: Surgical       Review of Systems  Review of Systems   Constitutional: Positive for fatigue. Negative for activity change, appetite change, chills, diaphoresis, fever and unexpected weight change.   HENT: Negative for sore throat and trouble swallowing.    Respiratory: Negative for shortness of breath.    Gastrointestinal: Positive for abdominal pain and diarrhea. Negative for abdominal distention, anal bleeding, anorexia, blood in stool, constipation, flatus, hematochezia, nausea, rectal pain and vomiting.   Genitourinary: Negative for dysuria, frequency and hematuria.   Musculoskeletal: Negative for arthralgias.   Skin: Negative for pallor.   Neurological: Negative for light-headedness and headaches.        /88 (BP Location: Right arm)   Pulse 74   Ht 154.9 cm (61\")   Wt 64.1 kg (141 lb 6.4 oz)   BMI 26.72 kg/m²     Objective    Physical Exam  Constitutional:       General: She is not in acute distress.     Appearance: Normal appearance. She is well-developed.   HENT:      Head: Normocephalic and atraumatic.   Neck:      Thyroid: No thyromegaly.   Pulmonary:      Effort: Pulmonary effort is normal.   Abdominal:      General: Bowel sounds are normal. There is no distension.      Palpations: Abdomen is not rigid.      Tenderness: There is abdominal tenderness in the epigastric area and left upper quadrant. There is no guarding.      Hernia: No hernia is present.   Musculoskeletal:      " Cervical back: Normal range of motion and neck supple.   Skin:     General: Skin is warm and dry.   Neurological:      Mental Status: She is alert and oriented to person, place, and time.   Psychiatric:         Speech: Speech normal.         Behavior: Behavior is cooperative.       Admission on 02/09/2021, Discharged on 02/10/2021   Component Date Value Ref Range Status   • Case Report 02/09/2021    Final                    Value:Surgical Pathology Report                         Case: [id number]                                  Authorizing Provider:  Salvatore Hernandez MD      Collected:           02/09/2021 08:23 AM          Ordering Location:     [facility]             Received:            02/09/2021 10:00 AM                                 [facility]                                                              Pathologist:           Jeffrey Samson MD                                                          Specimen:    Gallbladder, gallbladder plus contents                                                    • Final Diagnosis 02/09/2021    Final                    Value:This result contains rich text formatting which cannot be displayed here.   • Glucose 02/09/2021 121* 65 - 99 mg/dL Final   • BUN 02/09/2021 5* 6 - 20 mg/dL Final   • Creatinine 02/09/2021 0.70  0.57 - 1.00 mg/dL Final   • Sodium 02/09/2021 138  136 - 145 mmol/L Final   • Potassium 02/09/2021 4.0  3.5 - 5.2 mmol/L Final    Slight hemolysis detected by analyzer. Results may be affected.   • Chloride 02/09/2021 103  98 - 107 mmol/L Final   • CO2 02/09/2021 23.0  22.0 - 29.0 mmol/L Final   • Calcium 02/09/2021 8.7  8.6 - 10.5 mg/dL Final   • Total Protein 02/09/2021 7.0  6.0 - 8.5 g/dL Final   • Albumin 02/09/2021 3.20* 3.50 - 5.20 g/dL Final   • ALT (SGPT) 02/09/2021 8  1 - 33 U/L Final   • AST (SGOT) 02/09/2021 36* 1 - 32 U/L Final   • Alkaline Phosphatase 02/09/2021 110  39 - 117 U/L Final   • Total Bilirubin 02/09/2021 <0.2  0.0 - 1.2  mg/dL Final   • eGFR Non  Amer 02/09/2021 86  >60 mL/min/1.73 Final   • Globulin 02/09/2021 3.8  gm/dL Final   • A/G Ratio 02/09/2021 0.8  g/dL Final   • BUN/Creatinine Ratio 02/09/2021 7.1  7.0 - 25.0 Final   • Anion Gap 02/09/2021 12.0  5.0 - 15.0 mmol/L Final   • WBC 02/09/2021 14.26* 3.40 - 10.80 10*3/mm3 Final   • RBC 02/09/2021 3.65* 3.77 - 5.28 10*6/mm3 Final   • Hemoglobin 02/09/2021 11.7* 12.0 - 15.9 g/dL Final   • Hematocrit 02/09/2021 35.3  34.0 - 46.6 % Final   • MCV 02/09/2021 96.7  79.0 - 97.0 fL Final   • MCH 02/09/2021 32.1  26.6 - 33.0 pg Final   • MCHC 02/09/2021 33.1  31.5 - 35.7 g/dL Final   • RDW 02/09/2021 13.6  12.3 - 15.4 % Final   • RDW-SD 02/09/2021 48.9  37.0 - 54.0 fl Final   • MPV 02/09/2021 9.1  6.0 - 12.0 fL Final   • Platelets 02/09/2021 632* 140 - 450 10*3/mm3 Final    SPECIMEN REANALYZED TO CONFIRM PLATELET COUNT   • Neutrophil % 02/09/2021 91.0* 42.7 - 76.0 % Final   • Lymphocyte % 02/09/2021 8.0* 19.6 - 45.3 % Final   • Monocyte % 02/09/2021 1.0* 5.0 - 12.0 % Final   • Neutrophils Absolute 02/09/2021 12.98* 1.70 - 7.00 10*3/mm3 Final   • Lymphocytes Absolute 02/09/2021 1.14  0.70 - 3.10 10*3/mm3 Final   • Monocytes Absolute 02/09/2021 0.14  0.10 - 0.90 10*3/mm3 Final   • RBC Morphology 02/09/2021 Normal  Normal Final   • WBC Morphology 02/09/2021 Normal  Normal Final   • Platelet Estimate 02/09/2021 Adequate  Normal Final   • Glucose 02/10/2021 119* 65 - 99 mg/dL Final   • BUN 02/10/2021 3* 6 - 20 mg/dL Final   • Creatinine 02/10/2021 0.67  0.57 - 1.00 mg/dL Final   • Sodium 02/10/2021 139  136 - 145 mmol/L Final   • Potassium 02/10/2021 4.2  3.5 - 5.2 mmol/L Final    Slight hemolysis detected by analyzer. Results may be affected.   • Chloride 02/10/2021 104  98 - 107 mmol/L Final   • CO2 02/10/2021 26.0  22.0 - 29.0 mmol/L Final   • Calcium 02/10/2021 9.3  8.6 - 10.5 mg/dL Final   • Total Protein 02/10/2021 6.6  6.0 - 8.5 g/dL Final   • Albumin 02/10/2021 3.20* 3.50 - 5.20  g/dL Final   • ALT (SGPT) 02/10/2021 9  1 - 33 U/L Final   • AST (SGOT) 02/10/2021 27  1 - 32 U/L Final    Slight hemolysis detected by analyzer. Results may be affected.   • Alkaline Phosphatase 02/10/2021 94  39 - 117 U/L Final   • Total Bilirubin 02/10/2021 <0.2  0.0 - 1.2 mg/dL Final   • eGFR Non African Amer 02/10/2021 91  >60 mL/min/1.73 Final   • Globulin 02/10/2021 3.4  gm/dL Final   • A/G Ratio 02/10/2021 0.9  g/dL Final   • BUN/Creatinine Ratio 02/10/2021 4.5* 7.0 - 25.0 Final   • Anion Gap 02/10/2021 9.0  5.0 - 15.0 mmol/L Final   • WBC 02/10/2021 6.95  3.40 - 10.80 10*3/mm3 Final   • RBC 02/10/2021 3.50* 3.77 - 5.28 10*6/mm3 Final   • Hemoglobin 02/10/2021 10.8* 12.0 - 15.9 g/dL Final   • Hematocrit 02/10/2021 32.5* 34.0 - 46.6 % Final   • MCV 02/10/2021 92.9  79.0 - 97.0 fL Final   • MCH 02/10/2021 30.9  26.6 - 33.0 pg Final   • MCHC 02/10/2021 33.2  31.5 - 35.7 g/dL Final   • RDW 02/10/2021 13.6  12.3 - 15.4 % Final   • RDW-SD 02/10/2021 46.2  37.0 - 54.0 fl Final   • MPV 02/10/2021 10.5  6.0 - 12.0 fL Final   • Platelets 02/10/2021 432  140 - 450 10*3/mm3 Final   • Neutrophil % 02/10/2021 62.9  42.7 - 76.0 % Final   • Lymphocyte % 02/10/2021 24.3  19.6 - 45.3 % Final   • Monocyte % 02/10/2021 10.6  5.0 - 12.0 % Final   • Eosinophil % 02/10/2021 0.9  0.3 - 6.2 % Final   • Basophil % 02/10/2021 0.9  0.0 - 1.5 % Final   • Immature Grans % 02/10/2021 0.4  0.0 - 0.5 % Final   • Neutrophils, Absolute 02/10/2021 4.37  1.70 - 7.00 10*3/mm3 Final   • Lymphocytes, Absolute 02/10/2021 1.69  0.70 - 3.10 10*3/mm3 Final   • Monocytes, Absolute 02/10/2021 0.74  0.10 - 0.90 10*3/mm3 Final   • Eosinophils, Absolute 02/10/2021 0.06  0.00 - 0.40 10*3/mm3 Final   • Basophils, Absolute 02/10/2021 0.06  0.00 - 0.20 10*3/mm3 Final   • Immature Grans, Absolute 02/10/2021 0.03  0.00 - 0.05 10*3/mm3 Final   • nRBC 02/10/2021 0.0  0.0 - 0.2 /100 WBC Final     Assessment/Plan      1. Upper abdominal pain    2. Nausea    .        Orders placed during this encounter include:  Orders Placed This Encounter   Procedures   • Follow Anesthesia Guidelines / Standing Orders     Standing Status:   Future   • Obtain Informed Consent     Standing Status:   Future     Order Specific Question:   Informed Consent Given For     Answer:   ESOPHAGOGASTRODUODENOSCOPY possible dilation       ESOPHAGOGASTRODUODENOSCOPY possible dilation (N/A)    Review and/or summary of lab tests, radiology, procedures, medications. Review and summary of old records and obtaining of history. The risks and benefits of my recommendations, as well as other treatment options were discussed with the patient today. Questions were answered.    New Medications Ordered This Visit   Medications   • pantoprazole (PROTONIX) 40 MG EC tablet     Sig: Take 1 tablet by mouth Daily.     Dispense:  30 tablet     Refill:  5       Follow-up: Return in about 4 weeks (around 6/2/2021) for Recheck, After test.          This document has been electronically signed by DONALD Dias on May 5, 2021 10:16 CDT           I spent 18 minutes caring for Emelyn on this date of service. This time includes time spent by me in the following activities:preparing for the visit, reviewing tests, obtaining and/or reviewing a separately obtained history, performing a medically appropriate examination and/or evaluation , counseling and educating the patient/family/caregiver, ordering medications, tests, or procedures, referring and communicating with other health care professionals , documenting information in the medical record and care coordination    Results for orders placed or performed during the hospital encounter of 02/09/21   CBC Auto Differential    Specimen: Blood   Result Value Ref Range    WBC 6.95 3.40 - 10.80 10*3/mm3    RBC 3.50 (L) 3.77 - 5.28 10*6/mm3    Hemoglobin 10.8 (L) 12.0 - 15.9 g/dL    Hematocrit 32.5 (L) 34.0 - 46.6 %    MCV 92.9 79.0 - 97.0 fL    MCH 30.9 26.6 - 33.0 pg    MCHC  33.2 31.5 - 35.7 g/dL    RDW 13.6 12.3 - 15.4 %    RDW-SD 46.2 37.0 - 54.0 fl    MPV 10.5 6.0 - 12.0 fL    Platelets 432 140 - 450 10*3/mm3    Neutrophil % 62.9 42.7 - 76.0 %    Lymphocyte % 24.3 19.6 - 45.3 %    Monocyte % 10.6 5.0 - 12.0 %    Eosinophil % 0.9 0.3 - 6.2 %    Basophil % 0.9 0.0 - 1.5 %    Immature Grans % 0.4 0.0 - 0.5 %    Neutrophils, Absolute 4.37 1.70 - 7.00 10*3/mm3    Lymphocytes, Absolute 1.69 0.70 - 3.10 10*3/mm3    Monocytes, Absolute 0.74 0.10 - 0.90 10*3/mm3    Eosinophils, Absolute 0.06 0.00 - 0.40 10*3/mm3    Basophils, Absolute 0.06 0.00 - 0.20 10*3/mm3    Immature Grans, Absolute 0.03 0.00 - 0.05 10*3/mm3    nRBC 0.0 0.0 - 0.2 /100 WBC   CBC Auto Differential    Specimen: Blood   Result Value Ref Range    WBC 14.26 (H) 3.40 - 10.80 10*3/mm3    RBC 3.65 (L) 3.77 - 5.28 10*6/mm3    Hemoglobin 11.7 (L) 12.0 - 15.9 g/dL    Hematocrit 35.3 34.0 - 46.6 %    MCV 96.7 79.0 - 97.0 fL    MCH 32.1 26.6 - 33.0 pg    MCHC 33.1 31.5 - 35.7 g/dL    RDW 13.6 12.3 - 15.4 %    RDW-SD 48.9 37.0 - 54.0 fl    MPV 9.1 6.0 - 12.0 fL    Platelets 632 (H) 140 - 450 10*3/mm3   Tissue Pathology Exam    Specimen: Gallbladder; Tissue   Result Value Ref Range    Case Report       Surgical Pathology Report                         Case: [id number]                                  Authorizing Provider:  Salvatore Hernandez MD      Collected:           02/09/2021 08:23 AM          Ordering Location:     [facility]             Received:            02/09/2021 10:00 AM                                 [place] [facility]                                                              Pathologist:           Jeffrey Samson MD                                                          Specimen:    Gallbladder, gallbladder plus contents                                                     Final Diagnosis       SEE SCANNED REPORT       Manual Differential    Specimen: Blood   Result Value Ref Range    Neutrophil % 91.0 (H) 42.7 -  76.0 %    Lymphocyte % 8.0 (L) 19.6 - 45.3 %    Monocyte % 1.0 (L) 5.0 - 12.0 %    Neutrophils Absolute 12.98 (H) 1.70 - 7.00 10*3/mm3    Lymphocytes Absolute 1.14 0.70 - 3.10 10*3/mm3    Monocytes Absolute 0.14 0.10 - 0.90 10*3/mm3    RBC Morphology Normal Normal    WBC Morphology Normal Normal    Platelet Estimate Adequate Normal   Comprehensive Metabolic Panel    Specimen: Blood   Result Value Ref Range    Glucose 119 (H) 65 - 99 mg/dL    BUN 3 (L) 6 - 20 mg/dL    Creatinine 0.67 0.57 - 1.00 mg/dL    Sodium 139 136 - 145 mmol/L    Potassium 4.2 3.5 - 5.2 mmol/L    Chloride 104 98 - 107 mmol/L    CO2 26.0 22.0 - 29.0 mmol/L    Calcium 9.3 8.6 - 10.5 mg/dL    Total Protein 6.6 6.0 - 8.5 g/dL    Albumin 3.20 (L) 3.50 - 5.20 g/dL    ALT (SGPT) 9 1 - 33 U/L    AST (SGOT) 27 1 - 32 U/L    Alkaline Phosphatase 94 39 - 117 U/L    Total Bilirubin <0.2 0.0 - 1.2 mg/dL    eGFR Non African Amer 91 >60 mL/min/1.73    Globulin 3.4 gm/dL    A/G Ratio 0.9 g/dL    BUN/Creatinine Ratio 4.5 (L) 7.0 - 25.0    Anion Gap 9.0 5.0 - 15.0 mmol/L   Comprehensive Metabolic Panel    Specimen: Blood   Result Value Ref Range    Glucose 121 (H) 65 - 99 mg/dL    BUN 5 (L) 6 - 20 mg/dL    Creatinine 0.70 0.57 - 1.00 mg/dL    Sodium 138 136 - 145 mmol/L    Potassium 4.0 3.5 - 5.2 mmol/L    Chloride 103 98 - 107 mmol/L    CO2 23.0 22.0 - 29.0 mmol/L    Calcium 8.7 8.6 - 10.5 mg/dL    Total Protein 7.0 6.0 - 8.5 g/dL    Albumin 3.20 (L) 3.50 - 5.20 g/dL    ALT (SGPT) 8 1 - 33 U/L    AST (SGOT) 36 (H) 1 - 32 U/L    Alkaline Phosphatase 110 39 - 117 U/L    Total Bilirubin <0.2 0.0 - 1.2 mg/dL    eGFR Non African Amer 86 >60 mL/min/1.73    Globulin 3.8 gm/dL    A/G Ratio 0.8 g/dL    BUN/Creatinine Ratio 7.1 7.0 - 25.0    Anion Gap 12.0 5.0 - 15.0 mmol/L   Results for orders placed or performed in visit on 02/06/21   COVID-19,[facility] IN-HOUSE, NP/OP SWAB IN UTM/VTM/[facility] TRANSPORT MEDIA,24 HR TAT - Swab, Nasopharynx    Specimen:  Nasopharynx; Swab   Result Value Ref Range    COVID19 Not Detected Not Detected - Ref. Range   Results for orders placed or performed in visit on 02/05/21   CBC Auto Differential    Specimen: Blood   Result Value Ref Range    WBC 13.05 (H) 3.40 - 10.80 10*3/mm3    RBC 3.43 (L) 3.77 - 5.28 10*6/mm3    Hemoglobin 10.9 (L) 12.0 - 15.9 g/dL    Hematocrit 32.0 (L) 34.0 - 46.6 %    MCV 93.3 79.0 - 97.0 fL    MCH 31.8 26.6 - 33.0 pg    MCHC 34.1 31.5 - 35.7 g/dL    RDW 14.1 12.3 - 15.4 %    RDW-SD 47.6 37.0 - 54.0 fl    MPV 11.5 6.0 - 12.0 fL    Platelets 268 140 - 450 10*3/mm3    Neutrophil % 78.2 (H) 42.7 - 76.0 %    Lymphocyte % 13.1 (L) 19.6 - 45.3 %    Monocyte % 5.7 5.0 - 12.0 %    Eosinophil % 1.5 0.3 - 6.2 %    Basophil % 0.6 0.0 - 1.5 %    Immature Grans % 0.9 (H) 0.0 - 0.5 %    Neutrophils, Absolute 10.21 (H) 1.70 - 7.00 10*3/mm3    Lymphocytes, Absolute 1.71 0.70 - 3.10 10*3/mm3    Monocytes, Absolute 0.74 0.10 - 0.90 10*3/mm3    Eosinophils, Absolute 0.19 0.00 - 0.40 10*3/mm3    Basophils, Absolute 0.08 0.00 - 0.20 10*3/mm3    Immature Grans, Absolute 0.12 (H) 0.00 - 0.05 10*3/mm3    nRBC 0.0 0.0 - 0.2 /100 WBC     *Note: Due to a large number of results and/or encounters for the requested time period, some results have not been displayed. A complete set of results can be found in Results Review.

## 2021-05-23 ENCOUNTER — LAB (OUTPATIENT)
Dept: LAB | Facility: HOSPITAL | Age: 56
End: 2021-05-23

## 2021-05-23 DIAGNOSIS — Z01.818 PREOP TESTING: Primary | ICD-10-CM

## 2021-05-23 LAB — SARS-COV-2 N GENE RESP QL NAA+PROBE: NOT DETECTED

## 2021-05-23 PROCEDURE — C9803 HOPD COVID-19 SPEC COLLECT: HCPCS

## 2021-05-23 PROCEDURE — 87635 SARS-COV-2 COVID-19 AMP PRB: CPT

## 2021-05-24 RX ORDER — CIPROFLOXACIN 500 MG/1
500 TABLET, FILM COATED ORAL 2 TIMES DAILY
COMMUNITY
End: 2021-06-10

## 2021-05-24 RX ORDER — METRONIDAZOLE 500 MG/1
500 TABLET ORAL 3 TIMES DAILY
COMMUNITY
End: 2021-06-10

## 2021-05-26 ENCOUNTER — HOSPITAL ENCOUNTER (OUTPATIENT)
Facility: HOSPITAL | Age: 56
Setting detail: HOSPITAL OUTPATIENT SURGERY
Discharge: HOME | End: 2021-05-26
Attending: INTERNAL MEDICINE | Admitting: INTERNAL MEDICINE

## 2021-05-26 ENCOUNTER — ANESTHESIA EVENT (OUTPATIENT)
Dept: GASTROENTEROLOGY | Facility: HOSPITAL | Age: 56
End: 2021-05-26

## 2021-05-26 ENCOUNTER — ANESTHESIA (OUTPATIENT)
Dept: GASTROENTEROLOGY | Facility: HOSPITAL | Age: 56
End: 2021-05-26

## 2021-05-26 VITALS
HEIGHT: 61 IN | SYSTOLIC BLOOD PRESSURE: 105 MMHG | DIASTOLIC BLOOD PRESSURE: 59 MMHG | RESPIRATION RATE: 18 BRPM | HEART RATE: 59 BPM | WEIGHT: 133 LBS | TEMPERATURE: 97.3 F | OXYGEN SATURATION: 100 % | BODY MASS INDEX: 25.11 KG/M2

## 2021-05-26 DIAGNOSIS — R10.10 UPPER ABDOMINAL PAIN: ICD-10-CM

## 2021-05-26 DIAGNOSIS — R11.0 NAUSEA: ICD-10-CM

## 2021-05-26 PROCEDURE — 43239 EGD BIOPSY SINGLE/MULTIPLE: CPT | Performed by: INTERNAL MEDICINE

## 2021-05-26 PROCEDURE — 25010000002 PROPOFOL 10 MG/ML EMULSION: Performed by: NURSE ANESTHETIST, CERTIFIED REGISTERED

## 2021-05-26 RX ORDER — LIDOCAINE HYDROCHLORIDE 20 MG/ML
INJECTION, SOLUTION EPIDURAL; INFILTRATION; INTRACAUDAL; PERINEURAL AS NEEDED
Status: DISCONTINUED | OUTPATIENT
Start: 2021-05-26 | End: 2021-05-26 | Stop reason: SURG

## 2021-05-26 RX ORDER — ONDANSETRON 2 MG/ML
4 INJECTION INTRAMUSCULAR; INTRAVENOUS ONCE AS NEEDED
Status: DISCONTINUED | OUTPATIENT
Start: 2021-05-26 | End: 2021-05-26 | Stop reason: HOSPADM

## 2021-05-26 RX ORDER — DEXTROSE AND SODIUM CHLORIDE 5; .45 G/100ML; G/100ML
30 INJECTION, SOLUTION INTRAVENOUS CONTINUOUS PRN
Status: DISCONTINUED | OUTPATIENT
Start: 2021-05-26 | End: 2021-05-26 | Stop reason: HOSPADM

## 2021-05-26 RX ORDER — PROPOFOL 10 MG/ML
VIAL (ML) INTRAVENOUS AS NEEDED
Status: DISCONTINUED | OUTPATIENT
Start: 2021-05-26 | End: 2021-05-26 | Stop reason: SURG

## 2021-05-26 RX ADMIN — PROPOFOL 80 MG: 10 INJECTION, EMULSION INTRAVENOUS at 15:42

## 2021-05-26 RX ADMIN — DEXTROSE AND SODIUM CHLORIDE 30 ML/HR: 5; 450 INJECTION, SOLUTION INTRAVENOUS at 15:17

## 2021-05-26 RX ADMIN — PROPOFOL 20 MG: 10 INJECTION, EMULSION INTRAVENOUS at 15:45

## 2021-05-26 RX ADMIN — LIDOCAINE HYDROCHLORIDE 60 MG: 20 INJECTION, SOLUTION EPIDURAL; INFILTRATION; INTRACAUDAL; PERINEURAL at 15:42

## 2021-05-26 NOTE — ANESTHESIA POSTPROCEDURE EVALUATION
Patient: Emelyn Cook    Procedure Summary     Date: 05/26/21 Room / Location: [facility] ENDOSCOPY 1 / [facility]    Anesthesia Start: 1539 Anesthesia Stop: 1549    Procedure: ESOPHAGOGASTRODUODENOSCOPY possible dilation (N/A ) Diagnosis:       Upper abdominal pain      Nausea      (Upper abdominal pain [R10.10])      (Nausea [R11.0])    Surgeons: Alvino Florentino MD Provider: Lola Stallings CRNA    Anesthesia Type: MAC ASA Status: 2          Anesthesia Type: MAC    Vitals  No vitals data found for the desired time range.          Post Anesthesia Care and Evaluation    Patient location during evaluation: bedside  Patient participation: complete - patient participated  Level of consciousness: awake  Pain score: 0  Pain management: adequate  Airway patency: patent  Anesthetic complications: No anesthetic complications  PONV Status: none  Cardiovascular status: acceptable  Respiratory status: acceptable  Hydration status: acceptable

## 2021-05-26 NOTE — ANESTHESIA PREPROCEDURE EVALUATION
Anesthesia Evaluation     no history of anesthetic complications:  NPO Solid Status: > 8 hours  NPO Liquid Status: > 8 hours           Airway   Mallampati: I  TM distance: >3 FB  Neck ROM: full  No difficulty expected  Dental - normal exam     Pulmonary - normal exam   (+) a smoker Former,   Cardiovascular - normal exam    (+) hyperlipidemia,       Neuro/Psych  (+) numbness, psychiatric history Anxiety,     GI/Hepatic/Renal/Endo    (+)  GERD well controlled,      Musculoskeletal     (+) back pain,   Abdominal    Substance History - negative use     OB/GYN negative ob/gyn ROS         Other - negative ROS                       Anesthesia Plan    ASA 2     MAC     intravenous induction     Anesthetic plan, all risks, benefits, and alternatives have been provided, discussed and informed consent has been obtained with: patient.

## 2021-06-01 LAB
LAB AP CASE REPORT: NORMAL
PATH REPORT.FINAL DX SPEC: NORMAL

## 2021-06-10 ENCOUNTER — OFFICE VISIT (OUTPATIENT)
Dept: GASTROENTEROLOGY | Facility: CLINIC | Age: 56
End: 2021-06-10

## 2021-06-10 VITALS
WEIGHT: 136 LBS | BODY MASS INDEX: 25.68 KG/M2 | DIASTOLIC BLOOD PRESSURE: 90 MMHG | HEART RATE: 81 BPM | SYSTOLIC BLOOD PRESSURE: 125 MMHG | HEIGHT: 61 IN

## 2021-06-10 DIAGNOSIS — K21.00 GASTROESOPHAGEAL REFLUX DISEASE WITH ESOPHAGITIS WITHOUT HEMORRHAGE: Primary | ICD-10-CM

## 2021-06-10 PROCEDURE — 99213 OFFICE O/P EST LOW 20 MIN: CPT | Performed by: NURSE PRACTITIONER

## 2021-06-10 RX ORDER — FLUCONAZOLE 150 MG/1
TABLET ORAL
COMMUNITY
Start: 2021-05-18 | End: 2021-06-10

## 2021-06-10 RX ORDER — AZITHROMYCIN 250 MG/1
TABLET, FILM COATED ORAL
COMMUNITY
Start: 2021-06-09

## 2021-06-10 RX ORDER — ONDANSETRON 4 MG/1
TABLET, ORALLY DISINTEGRATING ORAL
COMMUNITY
Start: 2021-05-14

## 2021-06-10 NOTE — PROGRESS NOTES
"Chief Complaint   Patient presents with   • Diarrhea   • Abdominal Pain       Subjective    Emelyn Cook is a 55 y.o. female. she is being seen for follow up   Diarrhea   The problem has been resolved (recent C diff improved with antibiotics bowel habits back to normal now ). Associated symptoms include abdominal pain. Pertinent negatives include no arthralgias, chills, coughing, fever or vomiting.   Abdominal Pain  Associated symptoms include diarrhea (resolved after antibiotics ). Pertinent negatives include no arthralgias, belching, constipation, fever, nausea or vomiting. Her past medical history is significant for GERD.   Heartburn  She complains of abdominal pain. She reports no belching, no chest pain, no choking, no coughing, no dysphagia, no early satiety, no globus sensation, no heartburn, no hoarse voice, no nausea or no sore throat. This is a chronic problem. The current episode started more than 1 month ago. The problem occurs occasionally. The problem has been gradually improving. The symptoms are aggravated by exertion. Associated symptoms include fatigue. She has tried a PPI and a diet change (greatly modified diet ) for the symptoms. The treatment provided moderate relief. Past procedures include an EGD.   EGD noted mildly severe esophagitis gastritis and normal duodenum.  Duodenal biopsy noted no significant histologic abnormality.  Antrum biopsy noted reactive gastropathy.  Esophageal biopsy noted benign squamous mucosa.         The following portions of the patient's history were reviewed and updated as appropriate:   Past Medical History:   Diagnosis Date   • Anxiety    • Back pain     \"BULGING DISC\"   • Depression    • GERD (gastroesophageal reflux disease)    • Hyperlipidemia    • Pancreatitis due to common bile duct stone      Past Surgical History:   Procedure Laterality Date   •  SECTION     • CHOLECYSTECTOMY WITH INTRAOPERATIVE CHOLANGIOGRAM N/A 2021    Procedure: " Laparoscopic attempted, converted to open subtotal cholecystectomy;  Surgeon: Salvatore Hernandez MD;  Location: [facility] OR;  Service: General;  Laterality: N/A;   • COLONOSCOPY     • DIAGNOSTIC LAPAROSCOPY     • DILATATION AND CURETTAGE     • ENDOSCOPY N/A 5/26/2021    Procedure: ESOPHAGOGASTRODUODENOSCOPY possible dilation;  Surgeon: Alvino Florentino MD;  Location: [facility] ENDOSCOPY;  Service: Gastroenterology;  Laterality: N/A;   • HYSTERECTOMY       Family History   Problem Relation Age of Onset   • Diabetes Sister    • Diabetes Maternal Aunt      OB History    No obstetric history on file.       Prior to Admission medications    Medication Sig Start Date End Date Taking? Authorizing Provider   ALPRAZolam (XANAX) 1 MG tablet Take 1 mg by mouth 3 (Three) Times a Day As Needed for Anxiety.    Mehul Fair MD   azithromycin (ZITHROMAX) 250 MG tablet  6/9/21   Mehul Fair MD   carisoprodol (SOMA) 350 MG tablet Take 350 mg by mouth 4 (Four) Times a Day As Needed for Muscle Spasms.    Mehul Fair MD   ciprofloxacin (CIPRO) 500 MG tablet Take 500 mg by mouth 2 (Two) Times a Day.    Mehul Fair MD   estradiol (ESTRACE) 2 MG tablet Take 2 mg by mouth Daily.    Mehul Fair MD   fluconazole (DIFLUCAN) 150 MG tablet TAKE ONE TABLET BY MOUTH AS A ONE TIME DOSE REPEAT IN 7 DAYS IF NEEDED 5/18/21   Mehul Fair MD   gabapentin (NEURONTIN) 100 MG capsule Take 100 mg by mouth Daily As Needed. 1/14/21   Mehul Fair MD   HYDROcodone-acetaminophen (NORCO) 7.5-325 MG per tablet Take 1 tablet by mouth Every 6 (Six) Hours As Needed for Moderate Pain. 2/10/21   Salvatore Hernandez MD   loratadine (CLARITIN) 10 MG tablet Take 10 mg by mouth Every Night.    Mehul Fair MD   metroNIDAZOLE (FLAGYL) 500 MG tablet Take 500 mg by mouth 3 (Three) Times a Day.    Mehul Fair MD   ondansetron ODT (ZOFRAN-ODT) 4 MG disintegrating tablet DISSOLVE 1 TABLET IN MOUTH  "EVERY 8 HOURS AS NEEDED FOR 7 DAYS 5/14/21   ProviderMehul MD   pantoprazole (PROTONIX) 40 MG EC tablet Take 1 tablet by mouth Daily. 5/5/21   Jeannette Lucas APRN   Zolpidem Tartrate 10 MG sublingual tablet Place 0.5 tablets under the tongue At Night As Needed.    Mehul Fair MD   ondansetron (ZOFRAN) 4 MG tablet Take 4 mg by mouth Every 8 (Eight) Hours As Needed for Nausea or Vomiting.  6/10/21  Mehul Fair MD     Allergies   Allergen Reactions   • Levaquin [Levofloxacin] Mental Status Change     Social History     Socioeconomic History   • Marital status:      Spouse name: Not on file   • Number of children: Not on file   • Years of education: Not on file   • Highest education level: Not on file   Tobacco Use   • Smoking status: Former Smoker   • Smokeless tobacco: Never Used   • Tobacco comment: QUIT 20 YRS AGO   Vaping Use   • Vaping Use: Never used   Substance and Sexual Activity   • Alcohol use: Not Currently   • Drug use: Never   • Sexual activity: Yes     Partners: Female     Birth control/protection: Surgical       Review of Systems  Review of Systems   Constitutional: Positive for fatigue. Negative for activity change, appetite change, chills, diaphoresis, fever and unexpected weight change.   HENT: Negative for hoarse voice, sore throat and trouble swallowing.    Respiratory: Negative for cough, choking and shortness of breath.    Cardiovascular: Negative for chest pain.   Gastrointestinal: Positive for abdominal pain and diarrhea (resolved after antibiotics ). Negative for abdominal distention, anal bleeding, blood in stool, constipation, dysphagia, heartburn, nausea, rectal pain and vomiting.   Musculoskeletal: Negative for arthralgias.   Skin: Negative for pallor.   Neurological: Negative for light-headedness.        /90 (BP Location: Left arm)   Pulse 81   Ht 154.9 cm (61\")   Wt 61.7 kg (136 lb)   BMI 25.70 kg/m²     Objective    Physical " Exam  Constitutional:       General: She is not in acute distress.     Appearance: Normal appearance. She is well-developed.   HENT:      Head: Normocephalic and atraumatic.   Neck:      Thyroid: No thyromegaly.   Pulmonary:      Effort: Pulmonary effort is normal.   Abdominal:      General: Bowel sounds are normal. There is no distension.      Palpations: Abdomen is soft. Abdomen is not rigid.      Tenderness: There is abdominal tenderness (mild ) in the right upper quadrant. There is no guarding.      Hernia: No hernia is present.   Musculoskeletal:      Cervical back: Normal range of motion and neck supple.   Skin:     General: Skin is warm and dry.      Coloration: Skin is not pale.      Findings: No rash.   Neurological:      Mental Status: She is alert and oriented to person, place, and time.   Psychiatric:         Speech: Speech normal.         Behavior: Behavior is cooperative.       Admission on 05/26/2021, Discharged on 05/26/2021   Component Date Value Ref Range Status   • Case Report 05/26/2021    Final                    Value:Surgical Pathology Report                         Case: [id number]                                  Authorizing Provider:  Alvino Florentino MD        Collected:           05/26/2021 03:47 PM          Ordering Location:     [facility]             Received:            05/27/2021 06:49 AM                                 [place] [facility]                                                     Pathologist:           Nicholas Marques MD                                                           Specimens:   1) - Small Intestine, Duodenum, MM                                                                  2) - Gastric, Antrum, MM                                                                            3) - Esophagus, Distal, MM                                                                • Final Diagnosis 05/26/2021    Final                    Value:This result contains rich  text formatting which cannot be displayed here.     Assessment/Plan      1. Gastroesophageal reflux disease with esophagitis without hemorrhage    .   Discussed importance of continual dietary modifications and avoidance of gastric irritants continue PPI daily she will obtain refills from PCP as she has done previously follow-up in GI office as needed.    Orders placed during this encounter include:  No orders of the defined types were placed in this encounter.      * Surgery not found *    Review and/or summary of lab tests, radiology, procedures, medications. Review and summary of old records and obtaining of history. The risks and benefits of my recommendations, as well as other treatment options were discussed with the patient today. Questions were answered.    No orders of the defined types were placed in this encounter.      Follow-up: Return if symptoms worsen or fail to improve.           This document has been electronically signed by DONALD Dias on Rajani 10, 2021 10:24 CDT           I spent 18 minutes caring for Emelyn on this date of service. This time includes time spent by me in the following activities:preparing for the visit, reviewing tests, obtaining and/or reviewing a separately obtained history, performing a medically appropriate examination and/or evaluation , counseling and educating the patient/family/caregiver, ordering medications, tests, or procedures, referring and communicating with other health care professionals , documenting information in the medical record and care coordination    Results for orders placed or performed during the hospital encounter of 05/26/21   Tissue Pathology Exam    Specimen: A: Small Intestine, Duodenum; Tissue    B: Gastric, Antrum; Tissue    C: Esophagus, Distal; Tissue   Result Value Ref Range    Case Report       Surgical Pathology Report                         Case: [id number]                                  Authorizing Provider:  Alvino Florentino MD         Collected:           05/26/2021 03:47 PM          Ordering Location:     [facility]             Received:            05/27/2021 06:49 AM                                 [place] [facility]                                                     Pathologist:           Nicholas Marques MD                                                           Specimens:   1) - Small Intestine, Duodenum, MM                                                                  2) - Gastric, Antrum, MM                                                                            3) - Esophagus, Distal, MM                                                                 Final Diagnosis       SEE SCANNED REPORT       Results for orders placed or performed in visit on 05/23/21   COVID-19, [facility] IN-HOUSE, NP SWAB IN TRANSPORT MEDIA 8-10 HR TAT - Swab, Nasopharynx    Specimen: Nasopharynx; Swab   Result Value Ref Range    COVID19 Not Detected Not Detected - Ref. Range   Results for orders placed or performed during the hospital encounter of 02/09/21   CBC Auto Differential    Specimen: Blood   Result Value Ref Range    WBC 6.95 3.40 - 10.80 10*3/mm3    RBC 3.50 (L) 3.77 - 5.28 10*6/mm3    Hemoglobin 10.8 (L) 12.0 - 15.9 g/dL    Hematocrit 32.5 (L) 34.0 - 46.6 %    MCV 92.9 79.0 - 97.0 fL    MCH 30.9 26.6 - 33.0 pg    MCHC 33.2 31.5 - 35.7 g/dL    RDW 13.6 12.3 - 15.4 %    RDW-SD 46.2 37.0 - 54.0 fl    MPV 10.5 6.0 - 12.0 fL    Platelets 432 140 - 450 10*3/mm3    Neutrophil % 62.9 42.7 - 76.0 %    Lymphocyte % 24.3 19.6 - 45.3 %    Monocyte % 10.6 5.0 - 12.0 %    Eosinophil % 0.9 0.3 - 6.2 %    Basophil % 0.9 0.0 - 1.5 %    Immature Grans % 0.4 0.0 - 0.5 %    Neutrophils, Absolute 4.37 1.70 - 7.00 10*3/mm3    Lymphocytes, Absolute 1.69 0.70 - 3.10 10*3/mm3    Monocytes, Absolute 0.74 0.10 - 0.90 10*3/mm3    Eosinophils, Absolute 0.06 0.00 - 0.40 10*3/mm3    Basophils, Absolute 0.06 0.00 - 0.20 10*3/mm3    Immature Grans, Absolute 0.03  0.00 - 0.05 10*3/mm3    nRBC 0.0 0.0 - 0.2 /100 WBC   CBC Auto Differential    Specimen: Blood   Result Value Ref Range    WBC 14.26 (H) 3.40 - 10.80 10*3/mm3    RBC 3.65 (L) 3.77 - 5.28 10*6/mm3    Hemoglobin 11.7 (L) 12.0 - 15.9 g/dL    Hematocrit 35.3 34.0 - 46.6 %    MCV 96.7 79.0 - 97.0 fL    MCH 32.1 26.6 - 33.0 pg    MCHC 33.1 31.5 - 35.7 g/dL    RDW 13.6 12.3 - 15.4 %    RDW-SD 48.9 37.0 - 54.0 fl    MPV 9.1 6.0 - 12.0 fL    Platelets 632 (H) 140 - 450 10*3/mm3   Tissue Pathology Exam    Specimen: Gallbladder; Tissue   Result Value Ref Range    Case Report       Surgical Pathology Report                         Case: [id number]                                  Authorizing Provider:  Salvatore Hernandez MD      Collected:           02/09/2021 08:23 AM          Ordering Location:     [facility]             Received:            02/09/2021 10:00 AM                                 [place] [facility]                                                              Pathologist:           Jeffrey Samson MD                                                          Specimen:    Gallbladder, gallbladder plus contents                                                     Final Diagnosis       SEE SCANNED REPORT       Manual Differential    Specimen: Blood   Result Value Ref Range    Neutrophil % 91.0 (H) 42.7 - 76.0 %    Lymphocyte % 8.0 (L) 19.6 - 45.3 %    Monocyte % 1.0 (L) 5.0 - 12.0 %    Neutrophils Absolute 12.98 (H) 1.70 - 7.00 10*3/mm3    Lymphocytes Absolute 1.14 0.70 - 3.10 10*3/mm3    Monocytes Absolute 0.14 0.10 - 0.90 10*3/mm3    RBC Morphology Normal Normal    WBC Morphology Normal Normal    Platelet Estimate Adequate Normal   Comprehensive Metabolic Panel    Specimen: Blood   Result Value Ref Range    Glucose 119 (H) 65 - 99 mg/dL    BUN 3 (L) 6 - 20 mg/dL    Creatinine 0.67 0.57 - 1.00 mg/dL    Sodium 139 136 - 145 mmol/L    Potassium 4.2 3.5 - 5.2 mmol/L    Chloride 104 98 - 107 mmol/L    CO2 26.0 22.0 -  29.0 mmol/L    Calcium 9.3 8.6 - 10.5 mg/dL    Total Protein 6.6 6.0 - 8.5 g/dL    Albumin 3.20 (L) 3.50 - 5.20 g/dL    ALT (SGPT) 9 1 - 33 U/L    AST (SGOT) 27 1 - 32 U/L    Alkaline Phosphatase 94 39 - 117 U/L    Total Bilirubin <0.2 0.0 - 1.2 mg/dL    eGFR Non African Amer 91 >60 mL/min/1.73    Globulin 3.4 gm/dL    A/G Ratio 0.9 g/dL    BUN/Creatinine Ratio 4.5 (L) 7.0 - 25.0    Anion Gap 9.0 5.0 - 15.0 mmol/L   Comprehensive Metabolic Panel    Specimen: Blood   Result Value Ref Range    Glucose 121 (H) 65 - 99 mg/dL    BUN 5 (L) 6 - 20 mg/dL    Creatinine 0.70 0.57 - 1.00 mg/dL    Sodium 138 136 - 145 mmol/L    Potassium 4.0 3.5 - 5.2 mmol/L    Chloride 103 98 - 107 mmol/L    CO2 23.0 22.0 - 29.0 mmol/L    Calcium 8.7 8.6 - 10.5 mg/dL    Total Protein 7.0 6.0 - 8.5 g/dL    Albumin 3.20 (L) 3.50 - 5.20 g/dL    ALT (SGPT) 8 1 - 33 U/L    AST (SGOT) 36 (H) 1 - 32 U/L    Alkaline Phosphatase 110 39 - 117 U/L    Total Bilirubin <0.2 0.0 - 1.2 mg/dL    eGFR Non African Amer 86 >60 mL/min/1.73    Globulin 3.8 gm/dL    A/G Ratio 0.8 g/dL    BUN/Creatinine Ratio 7.1 7.0 - 25.0    Anion Gap 12.0 5.0 - 15.0 mmol/L   Results for orders placed or performed in visit on 02/06/21   COVID-19,[facility], NP/OP SWAB IN UTM/VTM/[facility] TRANSPORT MEDIA,24 HR TAT - Swab, Nasopharynx    Specimen: Nasopharynx; Swab   Result Value Ref Range    COVID19 Not Detected Not Detected - Ref. Range   Results for orders placed or performed in visit on 02/05/21   CBC Auto Differential    Specimen: Blood   Result Value Ref Range    WBC 13.05 (H) 3.40 - 10.80 10*3/mm3    RBC 3.43 (L) 3.77 - 5.28 10*6/mm3    Hemoglobin 10.9 (L) 12.0 - 15.9 g/dL    Hematocrit 32.0 (L) 34.0 - 46.6 %    MCV 93.3 79.0 - 97.0 fL    MCH 31.8 26.6 - 33.0 pg    MCHC 34.1 31.5 - 35.7 g/dL    RDW 14.1 12.3 - 15.4 %    RDW-SD 47.6 37.0 - 54.0 fl    MPV 11.5 6.0 - 12.0 fL    Platelets 268 140 - 450 10*3/mm3    Neutrophil % 78.2 (H) 42.7 - 76.0 %    Lymphocyte %  13.1 (L) 19.6 - 45.3 %    Monocyte % 5.7 5.0 - 12.0 %    Eosinophil % 1.5 0.3 - 6.2 %    Basophil % 0.6 0.0 - 1.5 %    Immature Grans % 0.9 (H) 0.0 - 0.5 %    Neutrophils, Absolute 10.21 (H) 1.70 - 7.00 10*3/mm3    Lymphocytes, Absolute 1.71 0.70 - 3.10 10*3/mm3    Monocytes, Absolute 0.74 0.10 - 0.90 10*3/mm3    Eosinophils, Absolute 0.19 0.00 - 0.40 10*3/mm3    Basophils, Absolute 0.08 0.00 - 0.20 10*3/mm3    Immature Grans, Absolute 0.12 (H) 0.00 - 0.05 10*3/mm3    nRBC 0.0 0.0 - 0.2 /100 WBC     *Note: Due to a large number of results and/or encounters for the requested time period, some results have not been displayed. A complete set of results can be found in Results Review.

## (undated) DEVICE — DRN WND FLT 90D HUBLSS FULL TROC 10MM LF

## (undated) DEVICE — SPNG DRN AMD EXCILON 6PLY 4X4IN PK/2

## (undated) DEVICE — SPNG GZ WOVN 4X4IN 12PLY 10/BX STRL

## (undated) DEVICE — TRAY,IRRIGATION,BULBSYRINGE,60ML,CSR,PVP: Brand: MEDLINE

## (undated) DEVICE — CVR SURG EQUIP BND RECTG 36X28

## (undated) DEVICE — SUT VIC 2/0 UR6 27IN VCP602H

## (undated) DEVICE — GLV SURG SIGNATURE ESSENTIAL PF LTX SZ6.5

## (undated) DEVICE — SUT PDS CLOSURE CT1 1/0 27IN Z341H

## (undated) DEVICE — SUT VIC 2/0 SH 27IN

## (undated) DEVICE — PROXIMATE PLUS MD MULTI-DIRECTIONAL RELEASE SKIN STAPLERS CONTAINS 35 STAINLESS STEEL STAPLES APPROXIMATE CLOSED DIMENSIONS: 6.9MM X 3.9MM WIDE: Brand: PROXIMATE

## (undated) DEVICE — ENDOPATH XCEL DILATING TIP TROCARS WITH STABILITY SLEEVES: Brand: ENDOPATH XCEL

## (undated) DEVICE — SOL IRR NACL 0.9PCT BT 1000ML

## (undated) DEVICE — PK LAP CHOLE LF 60

## (undated) DEVICE — GLV SURG SENSICARE PI LF PF 7.5 GRN STRL

## (undated) DEVICE — STERILE POLYISOPRENE POWDER-FREE SURGICAL GLOVES: Brand: PROTEXIS

## (undated) DEVICE — SUT MONOCRYL 4/0 PS2 27IN Y426H ETY426H

## (undated) DEVICE — SUT VIC 2/0 TIES 18IN J111T

## (undated) DEVICE — HARMONIC ACE +7 LAPAROSCOPIC SHEARS ADVANCED HEMOSTASIS 5MM DIAMETER 36CM SHAFT LENGTH  FOR USE WITH GRAY HAND PIECE ONLY: Brand: HARMONIC ACE

## (undated) DEVICE — VISUALIZATION SYSTEM: Brand: CLEARIFY

## (undated) DEVICE — SYR LUERLOK 30CC

## (undated) DEVICE — SOL IRRIG NACL 1000ML

## (undated) DEVICE — SUT VIC 3/0 SH 27IN J416H

## (undated) DEVICE — SUT SILK 2/0 FS BLK 18IN 685G

## (undated) DEVICE — GLV SURG SENSICARE PI PF LF 7 GRN STRL

## (undated) DEVICE — CORE TRUMPET FOR SINGLE SOLUTION BAG: Brand: CORE DYNAMICS

## (undated) DEVICE — PDS II VLT 0 107CM AG ST3: Brand: ENDOLOOP

## (undated) DEVICE — STERILE POLYISOPRENE POWDER-FREE SURGICAL GLOVES WITH EMOLLIENT COATING: Brand: PROTEXIS

## (undated) DEVICE — MONOPOLAR METZENBAUM SCISSOR TIP, DISPOSABLE: Brand: MONOPOLAR METZENBAUM SCISSOR TIP, DISPOSABLE

## (undated) DEVICE — GLV SURG SENSICARE PI ORTHO PF SZ7 LF STRL

## (undated) DEVICE — GOWN,NON-REINFORCED,SIRUS,SET IN SLV,XL: Brand: MEDLINE

## (undated) DEVICE — BITEBLOCK ENDO W/STRAP 60F A/ LF DISP

## (undated) DEVICE — SINGLE-USE BIOPSY FORCEPS: Brand: RADIAL JAW 4

## (undated) DEVICE — RESERVOIR,SUCTION,100CC,SILICONE: Brand: MEDLINE

## (undated) DEVICE — SYR LL TP 10ML STRL

## (undated) DEVICE — BLUNT TIP TROCAR: Brand: AUTO SUTURE

## (undated) DEVICE — SYR LUERLOK 20CC BX/50

## (undated) DEVICE — PENCL ES MEGADINE EZ/CLEAN BUTN W/HOLSTR 10FT

## (undated) DEVICE — LUER-LOK 360°: Brand: CONNECTA, LUER-LOK

## (undated) DEVICE — SKIN AFFIX SURG ADHESIVE 72/CS 0.55ML: Brand: MEDLINE

## (undated) DEVICE — CANN SMPL SOFTECH BIFLO ETCO2 A/M 7FT

## (undated) DEVICE — GLV SURG SIGNATURE ESSENTIAL PF LTX SZ7